# Patient Record
Sex: FEMALE | Race: WHITE | NOT HISPANIC OR LATINO | Employment: FULL TIME | URBAN - NONMETROPOLITAN AREA
[De-identification: names, ages, dates, MRNs, and addresses within clinical notes are randomized per-mention and may not be internally consistent; named-entity substitution may affect disease eponyms.]

---

## 2017-05-30 ENCOUNTER — COMMUNICATION - GICH (OUTPATIENT)
Dept: FAMILY MEDICINE | Facility: OTHER | Age: 32
End: 2017-05-30

## 2017-05-30 DIAGNOSIS — I10 ESSENTIAL (PRIMARY) HYPERTENSION: ICD-10-CM

## 2017-06-28 ENCOUNTER — AMBULATORY - GICH (OUTPATIENT)
Dept: LAB | Facility: OTHER | Age: 32
End: 2017-06-28

## 2017-06-28 ENCOUNTER — HISTORY (OUTPATIENT)
Dept: FAMILY MEDICINE | Facility: OTHER | Age: 32
End: 2017-06-28

## 2017-06-28 ENCOUNTER — OFFICE VISIT - GICH (OUTPATIENT)
Dept: FAMILY MEDICINE | Facility: OTHER | Age: 32
End: 2017-06-28

## 2017-06-28 DIAGNOSIS — I10 ESSENTIAL (PRIMARY) HYPERTENSION: ICD-10-CM

## 2017-06-28 DIAGNOSIS — Z13.1 ENCOUNTER FOR SCREENING FOR DIABETES MELLITUS: ICD-10-CM

## 2017-06-28 DIAGNOSIS — Z13.220 ENCOUNTER FOR SCREENING FOR LIPOID DISORDERS: ICD-10-CM

## 2017-06-28 DIAGNOSIS — Z00.00 ENCOUNTER FOR GENERAL ADULT MEDICAL EXAMINATION WITHOUT ABNORMAL FINDINGS: ICD-10-CM

## 2017-06-28 LAB
ANION GAP - HISTORICAL: 6 (ref 5–18)
BUN SERPL-MCNC: 12 MG/DL (ref 7–25)
BUN/CREAT RATIO - HISTORICAL: 14
CALCIUM SERPL-MCNC: 9.1 MG/DL (ref 8.6–10.3)
CHLORIDE SERPLBLD-SCNC: 103 MMOL/L (ref 98–107)
CHOL/HDL RATIO - HISTORICAL: 2.92
CHOLESTEROL TOTAL: 143 MG/DL
CO2 SERPL-SCNC: 26 MMOL/L (ref 21–31)
CREAT SERPL-MCNC: 0.86 MG/DL (ref 0.7–1.3)
GFR IF NOT AFRICAN AMERICAN - HISTORICAL: >60 ML/MIN/1.73M2
GLUCOSE SERPL-MCNC: 106 MG/DL (ref 70–105)
HDLC SERPL-MCNC: 49 MG/DL (ref 23–92)
LDLC SERPL CALC-MCNC: 82 MG/DL
NON-HDL CHOLESTEROL - HISTORICAL: 94 MG/DL
PATIENT STATUS - HISTORICAL: NORMAL
POTASSIUM SERPL-SCNC: 4 MMOL/L (ref 3.5–5.1)
SODIUM SERPL-SCNC: 135 MMOL/L (ref 133–143)
TRIGL SERPL-MCNC: 60 MG/DL

## 2017-12-27 NOTE — PROGRESS NOTES
Patient Information     Patient Name MRN Sex     Hoda Abrams [id number] Female 1985      Progress Notes by Tisha Pham DO at 2017  3:45 PM     Author:  Tisha Pham DO Service:  (none) Author Type:  PHYS- Osteopathic     Filed:  2017  7:11 AM Encounter Date:  2017 Status:  Signed     :  Tisha Pham DO (PHYS- Osteopathic)            There are no exam notes on file for this visit.    ANNUAL PHYSICAL - FEMALE    HPI: Hoda Abrams is a 32 y.o. female who presents for a yearly exam.  Concerns include: none    No LMP recorded.   Contraception: none, not sexually active  Risk for STI?: no  Last pap: 2015  Any hx of abnormal paps:  ASCUS at last pap, but with negative HPV.  Due again in 3 years.  FH of early CA?: No  Cholesterol/DM concerns/screening: yes, will obtain   Tobacco?: no  Calcium intake: No  DEXA: NA  Last mammo: NA  Colonoscopy: NA  Immunizations: Tdap 2008; recommend yearly flu vaccines; shingles and pneumonia vaccines due later.    Patient Active Problem List      Diagnosis Date Noted     Hypertension 2015     OBESITY 2012       No past medical history on file.    Past Surgical History:      Procedure  Laterality Date     WISDOM TEETH EXTRACTION         Social History     Social History        Marital status:  Single     Spouse name: N/A     Number of children:  N/A     Years of education:  N/A     Occupational History      Not on file.     Social History Main Topics       Smoking status: Never Smoker     Smokeless tobacco: Not on file     Alcohol use Yes     Drug use: No     Sexual activity: Not on file     Other Topics  Concern     Not on file      Social History Narrative     Grew up in . 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       Family History       Problem   Relation Age of Onset     Other  Mother      Colitis, obesity       Good Health  Father      Other  Sister      Obesity        "Psychiatric illness  Sister      Anxiety       Hypertension  Paternal Uncle      Diabetes  Maternal Grandfather      Diabetes  Paternal Grandfather        Current Outpatient Prescriptions       Medication  Sig Dispense Refill     lisinopril (PRINIVIL; ZESTRIL) 20 mg tablet TAKE ONE TABLET BY MOUTH ONCE DAILY. 90 tablet 4     No current facility-administered medications for this visit.      Medications have been reviewed by me and are current to the best of my knowledge and ability.       REVIEW OF SYSTEMS:  Refer to HPI; all other systems reviewed and negative.    PHYSICAL EXAM:  /82  Pulse 76  Ht 1.632 m (5' 4.25\")  Wt 119.7 kg (264 lb)  BMI 44.96 kg/m2  CONSTITUTIONAL:  Alert, cooperative, NAD.  EYES: No scleral icterus.  PERRLA.  Conjunctiva clear.  ENT/MOUTH: External ears and nose normal.  TMs normal.  Moist mucous membranes. Oropharynx clear.    ENDO: No thyromegaly or thyroid nodules.  LYMPH:  No cervical or supraclavicular LA.    BREASTS: declines  CARDIOVASCULAR: Regular, S1, S2.  No S3 or S4.  No murmur/gallop/rub.  No peripheral edema.  RESPIRATORY: CTA bilaterally, no wheezes, rhonchi or rales.  GI: Bowel sounds wnl.  Soft, nontender, nondistended.  No masses or HSM.  No rebound or guarding.  : declines  Pap smear obtained: no  MSKEL: Grossly normal ROM.  No clubbing.  INTEGUMENTARY:  Warm, dry.  No rash noted on exposed skin.  NEUROLOGIC: Facies symmetric.  Grossly normal movement and tone.  No tremor.  PSYCHIATRIC: Affect normal.  Speech fluent.      PHQ Depression Screen  Date of PHQ exam: 06/28/17  Over the last 2 weeks, how often have you been bothered by any of the following problems?  1. Little interest or pleasure in doing things: 0 - Not at all  2. Feeling down, depressed, or hopeless: 0 - Not at all    Results for orders placed or performed in visit on 06/28/17      LIPID PANEL      Result  Value Ref Range    CHOLESTEROL,TOTAL 143 <200 mg/dL    TRIGLYCERIDES 60 <150 mg/dL    HDL " CHOLESTEROL 49 23 - 92 mg/dL    NON-HDL CHOLESTEROL 94 <145 mg/dl    CHOL/HDL RATIO            2.92 <4.50                    LDL CHOLESTEROL 82 <100 mg/dL    PATIENT STATUS            FASTING                   BASIC METABOLIC PANEL      Result  Value Ref Range    SODIUM 135 133 - 143 mmol/L    POTASSIUM 4.0 3.5 - 5.1 mmol/L    CHLORIDE 103 98 - 107 mmol/L    CO2,TOTAL 26 21 - 31 mmol/L    ANION GAP 6 5 - 18                    GLUCOSE 106 (H) 70 - 105 mg/dL    CALCIUM 9.1 8.6 - 10.3 mg/dL    BUN 12 7 - 25 mg/dL    CREATININE 0.86 0.70 - 1.30 mg/dL    BUN/CREAT RATIO           14                    GFR if African American >60 >60 ml/min/1.73m2    GFR if not African American >60 >60 ml/min/1.73m2       ASSESSMENT/PLAN:    ICD-10-CM    1. Annual physical exam Z00.00    2. Hypertension I10 LIPID PANEL      BASIC METABOLIC PANEL   3. Lipid screening Z13.220 LIPID PANEL   4. Diabetes mellitus screening Z13.1 BASIC METABOLIC PANEL     Relevant cancer screening discussed.    Counseled on healthy diet, Calcium and vitamin D intake, and exercise.    HTN, stable: resume same dose of lisinopril.  Encouraged low salt diet and healthy activity levels to help improve BP control.    Follow up yearly; sooner prn.    ESTHER LOZADA DO

## 2018-01-26 ENCOUNTER — DOCUMENTATION ONLY (OUTPATIENT)
Dept: FAMILY MEDICINE | Facility: OTHER | Age: 33
End: 2018-01-26

## 2018-01-27 VITALS
BODY MASS INDEX: 45.07 KG/M2 | DIASTOLIC BLOOD PRESSURE: 82 MMHG | HEART RATE: 76 BPM | HEIGHT: 64 IN | WEIGHT: 264 LBS | SYSTOLIC BLOOD PRESSURE: 128 MMHG

## 2018-06-27 ENCOUNTER — TELEPHONE (OUTPATIENT)
Dept: FAMILY MEDICINE | Facility: OTHER | Age: 33
End: 2018-06-27

## 2018-06-27 DIAGNOSIS — I10 ESSENTIAL HYPERTENSION: Primary | ICD-10-CM

## 2018-06-27 RX ORDER — LISINOPRIL 20 MG/1
20 TABLET ORAL DAILY
Qty: 90 TABLET | Refills: 4 | Status: SHIPPED | OUTPATIENT
Start: 2018-06-27 | End: 2019-05-16

## 2018-06-27 RX ORDER — LISINOPRIL 20 MG/1
20 TABLET ORAL DAILY
COMMUNITY
Start: 2017-05-30 | End: 2018-06-27

## 2018-06-27 NOTE — TELEPHONE ENCOUNTER
Patient needs a refill of her medication, she will make an appointment.  Martine Joyce............................... 6/27/2018 3:31 PM

## 2018-09-17 ENCOUNTER — OFFICE VISIT (OUTPATIENT)
Dept: FAMILY MEDICINE | Facility: OTHER | Age: 33
End: 2018-09-17
Attending: NURSE PRACTITIONER
Payer: COMMERCIAL

## 2018-09-17 VITALS
HEART RATE: 74 BPM | SYSTOLIC BLOOD PRESSURE: 128 MMHG | TEMPERATURE: 97.9 F | DIASTOLIC BLOOD PRESSURE: 88 MMHG | WEIGHT: 263 LBS | BODY MASS INDEX: 42.27 KG/M2 | HEIGHT: 66 IN

## 2018-09-17 DIAGNOSIS — L23.7 CONTACT DERMATITIS DUE TO POISON IVY: Primary | ICD-10-CM

## 2018-09-17 PROCEDURE — 99213 OFFICE O/P EST LOW 20 MIN: CPT | Performed by: NURSE PRACTITIONER

## 2018-09-17 RX ORDER — TRIAMCINOLONE ACETONIDE 1 MG/G
CREAM TOPICAL
Qty: 80 G | Refills: 0 | Status: SHIPPED | OUTPATIENT
Start: 2018-09-17 | End: 2019-05-16

## 2018-09-17 ASSESSMENT — PAIN SCALES - GENERAL: PAINLEVEL: NO PAIN (1)

## 2018-09-17 NOTE — MR AVS SNAPSHOT
"              After Visit Summary   2018    Hoda Abrams    MRN: [id number]           Patient Information     Date Of Birth          1985        Visit Information        Provider Department      2018 9:30 AM Dipti Falk APRN CNP [facility]        Today's Diagnoses     Contact dermatitis due to poison ivy    -  1       Follow-ups after your visit        Who to contact     If you have questions or need follow up information about today's clinic visit or your schedule please contact [facility] directly at [phone].  Normal or non-critical lab and imaging results will be communicated to you by [employer] [facility] [employer], letter or phone within 4 business days after the clinic has received the results. If you do not hear from us within 7 days, please contact the clinic through IM5t or phone. If you have a critical or abnormal lab result, we will notify you by phone as soon as possible.  Submit refill requests through [employer] or call your pharmacy and they will forward the refill request to us. Please allow 3 business days for your refill to be completed.          Additional Information About Your Visit        MyChart Information     Zolpy lets you send messages to your doctor, view your test results, renew your prescriptions, schedule appointments and more. To sign up, go to [email or web address] . Click on \"Log in\" on the left side of the screen, which will take you to the Welcome page. Then click on \"Sign up Now\" on the right side of the page.     You will be asked to enter the access code listed below, as well as some personal information. Please follow the directions to create your username and password.     Your access code is: [email or web address]  Expires: 2018  9:57 AM     Your access code will  in 90 days. If you need help or a new code, please call your [place] clinic or [phone].        Care EveryWhere ID     This is your Care EveryWhere ID. " "This could be used by other organizations to access your [facility] medical records  [id number]        Your Vitals Were     Pulse Temperature Height Breastfeeding? BMI (Body Mass Index)       74 97.9  F (36.6  C) (Tympanic) 5' 5.75\" (1.67 m) No 42.78 kg/m2        Blood Pressure from Last 3 Encounters:   09/17/18 128/88   06/28/17 128/82   05/19/16 132/84    Weight from Last 3 Encounters:   09/17/18 263 lb (119.3 kg)   06/28/17 264 lb (119.7 kg)   05/19/16 245 lb 12.8 oz (111.5 kg)              Today, you had the following     No orders found for display         Today's Medication Changes          These changes are accurate as of 9/17/18  9:57 AM.  If you have any questions, ask your nurse or doctor.               Start taking these medicines.        Dose/Directions    triamcinolone 0.1 % cream   Commonly known as:  KENALOG   Used for:  Contact dermatitis due to poison ivy   Started by:  Dipti Falk APRN CNP        Apply sparingly to affected area three times daily as needed   Quantity:  80 g   Refills:  0            Where to get your medicines      These medications were sent to [facility], - [place], [place] - [address]  [address] , [place] [address]     Phone:  [phone]     triamcinolone 0.1 % cream                Primary Care Provider Office Phone # Fax #    Tisha PADMINI Pham [phone]       [address] [facility] [address]        Equal Access to Services     [facility]: Hadii aad ku hadasho Soomaali, waaxda luqadaha, qaybta kaalmada dashawn hannon . So [facility] [phone].    ATENCIÓN: Si habla español, tiene a clark disposición servicios gratuitos de asistencia lingüística. Llame al [phone].    We comply with applicable federal civil rights laws and [place] laws. We do not discriminate on the basis of race, color, national origin, age, disability, sex, sexual orientation, or gender " identity.            Thank you!     Thank you for choosing [facility] [place] [facility] AND \[facility]\""  for your care. Our goal is always to provide you with excellent care. Hearing back from our patients is one way we can continue to improve our services. Please take a few minutes to complete the written survey that you may receive in the mail after your visit with us. Thank you!             Your Updated Medication List - Protect others around you: Learn how to safely use, store and throw away your medicines at www.disposemymeds.org.          This list is accurate as of 9/17/18  9:57 AM.  Always use your most recent med list.                   Brand Name Dispense Instructions for use Diagnosis    lisinopril 20 MG tablet    PRINIVIL/ZESTRIL    90 tablet    Take 1 tablet (20 mg) by mouth daily    Essential hypertension       triamcinolone 0.1 % cream    KENALOG    80 g    Apply sparingly to affected area three times daily as needed    Contact dermatitis due to poison ivy

## 2018-09-17 NOTE — PROGRESS NOTES
HPI:    Hoda Abrams is a 33 year old female who presents to clinic today for rash. Has itchy rash on chest and under right breast. Has had this for about a week. She has a dog that runs in grass. Unsure of any exposure to poison ivy. Has used hydrocodone with mild relief. No other new exposures.     History reviewed. No pertinent past medical history.    Past Surgical History:   Procedure Laterality Date     EXTRACTION(S) DENTAL      2004         Current Outpatient Prescriptions   Medication Sig Dispense Refill     lisinopril (PRINIVIL/ZESTRIL) 20 MG tablet Take 1 tablet (20 mg) by mouth daily 90 tablet 4     triamcinolone (KENALOG) 0.1 % cream Apply sparingly to affected area three times daily as needed 80 g 0       No Known Allergies    ROS:  Pertinent positives and negatives are noted in HPI.    EXAM:  General appearance: well appearing female, in no acute distress  Dermatological: under right breast with linear rash, no pustules. No drainage or erythema  Psychological: normal affect, alert and pleasant    ASSESSMENT AND PLAN:    1. Contact dermatitis due to poison ivy      Tx with triamcinolone cream. Discussed other sx management and s/s that would warrant f/u. All questions were answered and she is in agreement with plan.         Dipti Falk..................9/17/2018 9:33 AM

## 2018-09-17 NOTE — NURSING NOTE
"Patient presents to clinic with complaints of an extremely itchy rash on her chest and under her right breast.      Lesli Friedman LPN 9/17/2018   9:33 AM    Chief Complaint   Patient presents with     Derm Problem       Initial /88 (BP Location: Right arm, Patient Position: Sitting, Cuff Size: Adult Large)  Pulse 74  Temp 97.9  F (36.6  C) (Tympanic)  Ht 5' 5.75\" (1.67 m)  Wt 263 lb (119.3 kg)  Breastfeeding? No  BMI 42.78 kg/m2 Estimated body mass index is 42.78 kg/(m^2) as calculated from the following:    Height as of this encounter: 5' 5.75\" (1.67 m).    Weight as of this encounter: 263 lb (119.3 kg).  Medication Reconciliation: complete    Lesli Friedman    "

## 2018-10-31 ENCOUNTER — HEALTH MAINTENANCE LETTER (OUTPATIENT)
Age: 33
End: 2018-10-31

## 2019-05-03 DIAGNOSIS — I10 ESSENTIAL HYPERTENSION: Primary | ICD-10-CM

## 2019-05-03 DIAGNOSIS — Z13.220 LIPID SCREENING: ICD-10-CM

## 2019-05-03 DIAGNOSIS — Z13.1 SCREENING FOR DIABETES MELLITUS: ICD-10-CM

## 2019-05-10 DIAGNOSIS — Z13.1 SCREENING FOR DIABETES MELLITUS: ICD-10-CM

## 2019-05-10 DIAGNOSIS — Z13.220 LIPID SCREENING: ICD-10-CM

## 2019-05-10 DIAGNOSIS — I10 ESSENTIAL HYPERTENSION: ICD-10-CM

## 2019-05-10 LAB
ANION GAP SERPL CALCULATED.3IONS-SCNC: 8 MMOL/L (ref 3–14)
BUN SERPL-MCNC: 14 MG/DL (ref 7–25)
CALCIUM SERPL-MCNC: 9.2 MG/DL (ref 8.6–10.3)
CHLORIDE SERPL-SCNC: 104 MMOL/L (ref 98–107)
CHOLEST SERPL-MCNC: 139 MG/DL
CO2 SERPL-SCNC: 26 MMOL/L (ref 21–31)
CREAT SERPL-MCNC: 0.92 MG/DL (ref 0.6–1.2)
GFR SERPL CREATININE-BSD FRML MDRD: 71 ML/MIN/{1.73_M2}
GLUCOSE SERPL-MCNC: 101 MG/DL (ref 70–105)
HBA1C MFR BLD: 5.5 % (ref 4–6)
HDLC SERPL-MCNC: 47 MG/DL (ref 23–92)
LDLC SERPL CALC-MCNC: 81 MG/DL
NONHDLC SERPL-MCNC: 92 MG/DL
POTASSIUM SERPL-SCNC: 4.1 MMOL/L (ref 3.5–5.1)
SODIUM SERPL-SCNC: 138 MMOL/L (ref 134–144)
TRIGL SERPL-MCNC: 57 MG/DL

## 2019-05-10 PROCEDURE — 80061 LIPID PANEL: CPT | Performed by: FAMILY MEDICINE

## 2019-05-10 PROCEDURE — 36415 COLL VENOUS BLD VENIPUNCTURE: CPT | Performed by: FAMILY MEDICINE

## 2019-05-10 PROCEDURE — 83036 HEMOGLOBIN GLYCOSYLATED A1C: CPT | Performed by: FAMILY MEDICINE

## 2019-05-10 PROCEDURE — 80048 BASIC METABOLIC PNL TOTAL CA: CPT | Performed by: FAMILY MEDICINE

## 2019-05-16 ENCOUNTER — OFFICE VISIT (OUTPATIENT)
Dept: FAMILY MEDICINE | Facility: OTHER | Age: 34
End: 2019-05-16
Attending: FAMILY MEDICINE
Payer: COMMERCIAL

## 2019-05-16 VITALS
WEIGHT: 263 LBS | DIASTOLIC BLOOD PRESSURE: 94 MMHG | HEIGHT: 66 IN | RESPIRATION RATE: 16 BRPM | BODY MASS INDEX: 42.27 KG/M2 | TEMPERATURE: 99.6 F | HEART RATE: 64 BPM | SYSTOLIC BLOOD PRESSURE: 136 MMHG

## 2019-05-16 DIAGNOSIS — Z23 NEED FOR TDAP VACCINATION: ICD-10-CM

## 2019-05-16 DIAGNOSIS — I10 ESSENTIAL HYPERTENSION: ICD-10-CM

## 2019-05-16 DIAGNOSIS — E66.813 CLASS 3 SEVERE OBESITY DUE TO EXCESS CALORIES WITH SERIOUS COMORBIDITY AND BODY MASS INDEX (BMI) OF 40.0 TO 44.9 IN ADULT (H): ICD-10-CM

## 2019-05-16 DIAGNOSIS — E66.01 CLASS 3 SEVERE OBESITY DUE TO EXCESS CALORIES WITH SERIOUS COMORBIDITY AND BODY MASS INDEX (BMI) OF 40.0 TO 44.9 IN ADULT (H): ICD-10-CM

## 2019-05-16 DIAGNOSIS — Z00.00 ROUTINE HISTORY AND PHYSICAL EXAMINATION OF ADULT: Primary | ICD-10-CM

## 2019-05-16 DIAGNOSIS — Z12.4 CERVICAL CANCER SCREENING: ICD-10-CM

## 2019-05-16 PROCEDURE — 90471 IMMUNIZATION ADMIN: CPT | Performed by: FAMILY MEDICINE

## 2019-05-16 PROCEDURE — 88142 CYTOPATH C/V THIN LAYER: CPT | Performed by: FAMILY MEDICINE

## 2019-05-16 PROCEDURE — 90715 TDAP VACCINE 7 YRS/> IM: CPT | Performed by: FAMILY MEDICINE

## 2019-05-16 PROCEDURE — 40001026 ZZHCL STATISTICAL PAP TEST QC: Performed by: FAMILY MEDICINE

## 2019-05-16 PROCEDURE — 99395 PREV VISIT EST AGE 18-39: CPT | Mod: 25 | Performed by: FAMILY MEDICINE

## 2019-05-16 PROCEDURE — G0123 SCREEN CERV/VAG THIN LAYER: HCPCS | Performed by: FAMILY MEDICINE

## 2019-05-16 PROCEDURE — 87624 HPV HI-RISK TYP POOLED RSLT: CPT | Mod: ZL | Performed by: FAMILY MEDICINE

## 2019-05-16 RX ORDER — LISINOPRIL 20 MG/1
20 TABLET ORAL DAILY
Qty: 90 TABLET | Refills: 4 | Status: SHIPPED | OUTPATIENT
Start: 2019-05-16 | End: 2019-05-16

## 2019-05-16 RX ORDER — LISINOPRIL 20 MG/1
20 TABLET ORAL DAILY
Qty: 90 TABLET | Refills: 4 | Status: SHIPPED | OUTPATIENT
Start: 2019-05-16 | End: 2020-07-17

## 2019-05-16 ASSESSMENT — MIFFLIN-ST. JEOR: SCORE: 1906.77

## 2019-05-16 NOTE — NURSING NOTE
Prior to injection, verified patient identity using patient's name and date of birth.  Due to injection administration, patient instructed to remain in clinic for 15 minutes  afterwards, and to report any adverse reaction to me immediately.    TDAP    Drug Amount Wasted:  None.  Vial/Syringe: Syringe  Expiration Date:  6/5/21

## 2019-05-16 NOTE — LETTER
Hoda Abrams  [address]  [place] [address]    5/16/2019      Dear Ms. Abrams,      I wanted to let you know about your recent labs.    Everything looks improved!    Please contact us at [phone] with any questions or concerns that you have.    I have attached your lab results for your records.        Sincerely,         Tisha Pham     Results for orders placed or performed in visit on 05/10/19   Hemoglobin A1c   Result Value Ref Range    Hemoglobin A1C 5.5 4.0 - 6.0 %   Lipid Panel   Result Value Ref Range    Cholesterol 139 <200 mg/dL    Triglycerides 57 <150 mg/dL    HDL Cholesterol 47 23 - 92 mg/dL    LDL Cholesterol Calculated 81 <100 mg/dL    Non HDL Cholesterol 92 <130 mg/dL   Basic Metabolic Panel   Result Value Ref Range    Sodium 138 134 - 144 mmol/L    Potassium 4.1 3.5 - 5.1 mmol/L    Chloride 104 98 - 107 mmol/L    Carbon Dioxide 26 21 - 31 mmol/L    Anion Gap 8 3 - 14 mmol/L    Glucose 101 70 - 105 mg/dL    Urea Nitrogen 14 7 - 25 mg/dL    Creatinine 0.92 0.60 - 1.20 mg/dL    GFR Estimate 71 >60 mL/min/[1.73_m2]    GFR Estimate If Black 86 >60 mL/min/[1.73_m2]    Calcium 9.2 8.6 - 10.3 mg/dL

## 2019-05-16 NOTE — PROGRESS NOTES
Nursing Notes:   Martine Haskins LPN  5/16/2019  2:23 PM  Signed  Patient here for physical.  Medication Reconciliation: REI Huggins Emily J., LPN  5/16/2019  3:29 PM  Signed  Prior to injection, verified patient identity using patient's name and date of birth.  Due to injection administration, patient instructed to remain in clinic for 15 minutes  afterwards, and to report any adverse reaction to me immediately.    TDAP  Drug Amount Wasted:  None.  Vial/Syringe: Syringe  Expiration Date:  6/5/21    ANNUAL PHYSICAL - FEMALE  HPI: Hoda presents for a yearly exam.  Concerns include:  1. None; has been working on diet/exercise and has lost some weight.  Had labs performed prior to physical and they are greatly improved, including her Hgb A1c.  2. BP stable.  Denies any headaches, vision changes, SOB, CP, diaphoresis or indigestion.  3. Needs Tdap.    No LMP recorded.   Contraception: NA  Risk for STI?: No  Last pap: 8/18/2015; ASCUS with neg HPV.  Any hx of abnormal paps:  None prior to that.  FH ofearly CA?: No  Cholesterol/DM concerns/screening: recently done; reviewed.  Tobacco?: No  Calcium intake: No  DEXA: NA  Last mammo: NA  Colonoscopy: NA  Immunizations: Tdap due; flu vaccines yearly with work.    Patient Active Problem List   Diagnosis     Essential hypertension     Obesity     No past medical history on file.    Past Surgical History:   Procedure Laterality Date     EXTRACTION(S) DENTAL      2004       Social History     Socioeconomic History     Marital status: Single     Spouse name: Not on file     Number of children: Not on file     Years of education: Not on file     Highest education level: Not on file   Occupational History     Not on file   Social Needs     Financial resource strain: Not on file     Food insecurity:     Worry: Not on file     Inability: Not on file     Transportation needs:     Medical: Not on file     Non-medical: Not on file   Tobacco Use      Smoking status: Never Smoker     Smokeless tobacco: Never Used   Substance and Sexual Activity     Alcohol use: Yes     Comment: Beer a day     Drug use: No     Comment: Drug use: No     Sexual activity: Not on file   Lifestyle     Physical activity:     Days per week: Not on file     Minutes per session: Not on file     Stress: Not on file   Relationships     Social connections:     Talks on phone: Not on file     Gets together: Not on file     Attends Scientologist service: Not on file     Active member of club or organization: Not on file     Attends meetings of clubs or organizations: Not on file     Relationship status: Not on file     Intimate partner violence:     Fear of current or ex partner: Not on file     Emotionally abused: Not on file     Physically abused: Not on file     Forced sexual activity: Not on file   Other Topics Concern     Parent/sibling w/ CABG, MI or angioplasty before 65F 55M? Not Asked   Social History Narrative    Grew up in . 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       Family History   Problem Relation Age of Onset     Colitis Mother      Obesity Mother      No Known Problems Father      Anxiety Disorder Sister      Obesity Sister      Diabetes Maternal Grandfather      Diabetes Paternal Grandfather      Hypertension Paternal Uncle      Pancreatic Cancer Maternal Aunt        Current Outpatient Medications   Medication Sig Dispense Refill     lisinopril (PRINIVIL/ZESTRIL) 20 MG tablet Take 1 tablet (20 mg) by mouth daily 90 tablet 4     triamcinolone (KENALOG) 0.1 % cream Apply sparingly to affected area three times daily as needed 80 g 0        REVIEW OF SYSTEMS:  Refer to HPI; all other systems reviewed and negative.    PHYSICAL EXAM:  There were no vitals taken for this visit.  CONSTITUTIONAL:  Alert, cooperative, NAD.  EYES: No scleral icterus.  PERRLA.  Conjunctiva clear.  ENT/MOUTH: External ears and nose normal.  TMs normal.  Moist mucous  membranes. Oropharynx clear.    ENDO: No thyromegaly or thyroid nodules.  LYMPH:  No cervical or supraclavicular LA.    BREASTS: No skin abnormalities, no erythema.  No discrete masses.  No nipple discharge, no axillary, supra- or infraclavicular LA.   CARDIOVASCULAR: Regular, S1, S2.  No S3 or S4.  No murmur/gallop/rub.  No peripheral edema.  RESPIRATORY: CTA bilaterally, no wheezes, rhonchi or rales.  GI: Bowel sounds wnl.  Soft, nontender, non-distended.  No masses or HSM.  No rebound or guarding.  : Vulva: normal, no lesions or discharge  Urethral meatus: normal size and location, no lesions or discharge  Urethra: no tenderness or masses  Bladder: no fullness or tenderness  Vagina: normal appearance, no abnormal discharge, no lesions.  No evidence of cystocele or rectocele.  Cervix: normal appearance, no lesions, no abnormal discharge.  Uterus: normal size and position, mobile, non-tender  Adnexa: nopalpable masses bilaterally. No cervical motion tenderness.  Pap smear obtained: yes  MSKEL: Grossly normal ROM.  No clubbing.  INTEGUMENTARY:Warm, dry.  No rash noted on exposed skin.  NEUROLOGIC: Facies symmetric.  Grossly normal movement and tone.  No tremor.  PSYCHIATRIC: Affect normal.  Speech fluent.      PHQ-9 SCORE 8/18/2015 5/19/2016   PHQ-9 Total Score 1 1     PHQ-2 Score:     PHQ-2 ( 1999 Pfizer) 9/17/2018   Q1: Little interest or pleasure in doing things 0   Q2: Feeling down, depressed or hopeless 0   PHQ-2 Score 0     No flowsheet data found.          Results for orders placed or performed in visit on 05/10/19   Hemoglobin A1c   Result Value Ref Range    Hemoglobin A1C 5.5 4.0 - 6.0 %   Lipid Panel   Result Value Ref Range    Cholesterol 139 <200 mg/dL    Triglycerides 57 <150 mg/dL    HDL Cholesterol 47 23 - 92 mg/dL    LDL Cholesterol Calculated 81 <100 mg/dL    Non HDL Cholesterol 92 <130 mg/dL   Basic Metabolic Panel   Result Value Ref Range    Sodium 138 134 - 144 mmol/L    Potassium 4.1 3.5 - 5.1  mmol/L    Chloride 104 98 - 107 mmol/L    Carbon Dioxide 26 21 - 31 mmol/L    Anion Gap 8 3 - 14 mmol/L    Glucose 101 70 - 105 mg/dL    Urea Nitrogen 14 7 - 25 mg/dL    Creatinine 0.92 0.60 - 1.20 mg/dL    GFR Estimate 71 >60 mL/min/[1.73_m2]    GFR Estimate If Black 86 >60 mL/min/[1.73_m2]    Calcium 9.2 8.6 - 10.3 mg/dL       ASSESSMENT/PLAN:  1. Routine history and physical examination of adult    2. Cervical cancer screening  - Pap Screen Thin Prep with HPV - recommended age 30 - 65 years (select HPV order below)  - HPV High Risk Types DNA Cervical    3. Need for Tdap vaccination  - GH IMM-  TDAP VACCINE (BOOSTRIX )    4. Essential hypertension  Chronic, stable.  Refilled her lisinopril at the same dose.  If continued efforts of diet changes and weight loss are effective - will likely see decreased BP and may start to be able to wean medications.  Recent labs stable.   - lisinopril (PRINIVIL/ZESTRIL) 20 MG tablet; Take 1 tablet (20 mg) by mouth daily  Dispense: 90 tablet; Refill: 4    5. Class 3 severe obesity due to excess calories with serious comorbidity and body mass index (BMI) of 40.0 to 44.9 in adult (H)  Improved; encouraged on continued efforts.      Relevant cancer screening discussed.    Counseled on healthy diet, Calcium and vitamin D intake, and exercise.    Tisha Pham, DO  Family Practice

## 2019-05-24 LAB
COPATH REPORT: NORMAL
FINAL DIAGNOSIS: NORMAL
HPV HR 12 DNA CVX QL NAA+PROBE: NEGATIVE
HPV16 DNA SPEC QL NAA+PROBE: NEGATIVE
HPV18 DNA SPEC QL NAA+PROBE: NEGATIVE
PAP: NORMAL
SPECIMEN DESCRIPTION: NORMAL
SPECIMEN SOURCE CVX/VAG CYTO: NORMAL

## 2019-06-24 ENCOUNTER — THERAPY VISIT (OUTPATIENT)
Dept: CHIROPRACTIC MEDICINE | Facility: OTHER | Age: 34
End: 2019-06-24
Attending: CHIROPRACTOR
Payer: COMMERCIAL

## 2019-06-24 DIAGNOSIS — E66.813 CLASS 3 SEVERE OBESITY DUE TO EXCESS CALORIES WITH SERIOUS COMORBIDITY AND BODY MASS INDEX (BMI) OF 40.0 TO 44.9 IN ADULT (H): ICD-10-CM

## 2019-06-24 DIAGNOSIS — M54.50 ACUTE MIDLINE LOW BACK PAIN WITHOUT SCIATICA: ICD-10-CM

## 2019-06-24 DIAGNOSIS — E66.01 CLASS 3 SEVERE OBESITY DUE TO EXCESS CALORIES WITH SERIOUS COMORBIDITY AND BODY MASS INDEX (BMI) OF 40.0 TO 44.9 IN ADULT (H): ICD-10-CM

## 2019-06-24 DIAGNOSIS — M99.03 SEGMENTAL AND SOMATIC DYSFUNCTION OF LUMBAR REGION: Primary | ICD-10-CM

## 2019-06-24 PROCEDURE — 98940 CHIROPRACT MANJ 1-2 REGIONS: CPT | Mod: AT | Performed by: CHIROPRACTOR

## 2019-06-24 PROCEDURE — 99202 OFFICE O/P NEW SF 15 MIN: CPT | Mod: 25 | Performed by: CHIROPRACTOR
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
## 2019-06-26 ENCOUNTER — THERAPY VISIT (OUTPATIENT)
Dept: CHIROPRACTIC MEDICINE | Facility: OTHER | Age: 34
End: 2019-06-26
Attending: CHIROPRACTOR
Payer: COMMERCIAL

## 2019-06-26 DIAGNOSIS — E66.01 CLASS 3 SEVERE OBESITY DUE TO EXCESS CALORIES WITH SERIOUS COMORBIDITY AND BODY MASS INDEX (BMI) OF 40.0 TO 44.9 IN ADULT (H): ICD-10-CM

## 2019-06-26 DIAGNOSIS — M99.03 SEGMENTAL AND SOMATIC DYSFUNCTION OF LUMBAR REGION: Primary | ICD-10-CM

## 2019-06-26 DIAGNOSIS — M99.04 SEGMENTAL AND SOMATIC DYSFUNCTION OF SACRAL REGION: ICD-10-CM

## 2019-06-26 DIAGNOSIS — E66.813 CLASS 3 SEVERE OBESITY DUE TO EXCESS CALORIES WITH SERIOUS COMORBIDITY AND BODY MASS INDEX (BMI) OF 40.0 TO 44.9 IN ADULT (H): ICD-10-CM

## 2019-06-26 DIAGNOSIS — M54.50 ACUTE MIDLINE LOW BACK PAIN WITHOUT SCIATICA: ICD-10-CM

## 2019-06-26 PROCEDURE — 98940 CHIROPRACT MANJ 1-2 REGIONS: CPT | Mod: AT | Performed by: CHIROPRACTOR

## 2019-06-26 NOTE — PATIENT INSTRUCTIONS
ice 20 minutes every other hour as needed, heat 15 minutes every other hour as needed and walk 10 minutes

## 2019-06-26 NOTE — PROGRESS NOTES
Visit #:  2    Subjective:  Hoda Abrams is a 34 year old female who is seen in f/u up for:        Segmental and somatic dysfunction of lumbar region  Segmental and somatic dysfunction of sacral region  Acute midline low back pain without sciatica  Class 3 severe obesity due to excess calories with serious comorbidity and body mass index (BMI) of 40.0 to 44.9 in adult (H).     Since last visit on 6/24/2019,  Hoda Abrams reports:    Area of chief complaint:  Lumbar :  Symptoms are graded at 2-5/10. The quality is described as achey, sore.  Motion has increased, but is still not normal, patient notes difficulty with standing and sitting for extended periods of time. Patient feels that they are improved due to a reduction in symptoms. Patient notes symptoms are interfering with sleep, however this is improving slowly. Patient has returned to work since initial visit.           Objective:  The following was observed:    P: palpatory tendernessPresent over the TL junction midline and along the sacral base:    A: static palpation demonstrates intersegmental asymmetry , lumbar, pelvis  R: motion palpation notes restricted motion, L1 and Sacrum   T: Taut and tender fibers are noted of the gluteus medius bilaterally and quadratus lumborum bilaterally.  These muscles do not appear to be spastic on today's visit    Segmental spinal dysfunction/restrictions found at:  :  L1 Extension restriction  Sacrum Flexion restriction.      Assessment: Patient appears to making progress.  Palpatory tenderness decreasing and some ADLs are less affected due to pain.  Patient is still having difficulty with sleeping.  I believe that this will continue to show improvement with additional visits.    Diagnoses:      1. Segmental and somatic dysfunction of lumbar region    2. Segmental and somatic dysfunction of sacral region    3. Acute midline low back pain without sciatica    4. Class 3 severe obesity due to excess calories with serious  comorbidity and body mass index (BMI) of 40.0 to 44.9 in adult (H)        Patient's condition:  Patient had restrictions pre-manipulation and Patient symptoms are gradually improving    Treatment effectiveness:  Post manipulation there is better intersegmental movement, Patient claims to feel looser post manipulation, Muscle spasm is reducing and Patient is able to do some ADL's with less pain      Procedures:  CMT:  79107 Chiropractic manipulative treatment 1-2 regions performed   Lumbar: Diversified, L1, Prone  Pelvis: Drop Table, Sacrum , Prone    Modalities:  None performed this visit    Therapeutic procedures:  None  Patient was encouraged to contract gluteal musculature when performing extended periods of sitting or standing.  This done to promote proper alignment of lumbopelvic region thus reducing stress placed on affected joints of the lumbar spine    Response to Treatment  Reduction in symptoms as reported by patient    Prognosis: Excellent    Progress towards Goals: Patient is making progress towards the goal.     Recommendations:    Instructions:ice 20 minutes every other hour as needed, heat 15 minutes every other hour as needed and walk 10 minutes    Follow-up:  Return to care in 5 days.

## 2019-07-01 ENCOUNTER — THERAPY VISIT (OUTPATIENT)
Dept: CHIROPRACTIC MEDICINE | Facility: OTHER | Age: 34
End: 2019-07-01
Attending: CHIROPRACTOR
Payer: COMMERCIAL

## 2019-07-01 DIAGNOSIS — M54.50 ACUTE MIDLINE LOW BACK PAIN WITHOUT SCIATICA: ICD-10-CM

## 2019-07-01 DIAGNOSIS — E66.01 CLASS 3 SEVERE OBESITY DUE TO EXCESS CALORIES WITH SERIOUS COMORBIDITY AND BODY MASS INDEX (BMI) OF 40.0 TO 44.9 IN ADULT (H): ICD-10-CM

## 2019-07-01 DIAGNOSIS — M99.03 SEGMENTAL AND SOMATIC DYSFUNCTION OF LUMBAR REGION: Primary | ICD-10-CM

## 2019-07-01 DIAGNOSIS — E66.813 CLASS 3 SEVERE OBESITY DUE TO EXCESS CALORIES WITH SERIOUS COMORBIDITY AND BODY MASS INDEX (BMI) OF 40.0 TO 44.9 IN ADULT (H): ICD-10-CM

## 2019-07-01 PROCEDURE — 98940 CHIROPRACT MANJ 1-2 REGIONS: CPT | Mod: AT | Performed by: CHIROPRACTOR

## 2019-07-01 NOTE — PATIENT INSTRUCTIONS
ice 20 minutes every other hour as needed, heat 15 minutes every other hour as needed and utilize tennis ball for massage when needed

## 2019-07-01 NOTE — PROGRESS NOTES
Visit #:  3    Subjective:  Hoda Abrams is a 34 year old female who is seen in f/u up for:        Segmental and somatic dysfunction of lumbar region  Acute midline low back pain without sciatica  Class 3 severe obesity due to excess calories with serious comorbidity and body mass index (BMI) of 40.0 to 44.9 in adult (H).     Since last visit on 6/26/2019,  Hoda Abrams reports:    Area of chief complaint:  Lumbar :  Symptoms are graded at 0-2/10. The quality is described as stiff, sore.  Patient reports being able to perform most of her ADL's without painful limits. Patient did take an extended car trip this weekend and noted some discomfort with extended sitting. Patient did have some disrupted sleep due to back pain. However these have both been isolated incidents  Patient feels much improvement following last treatment.     Objective:  The following was observed:    P: palpatory tenderness minimal over the TL junction midline:    A: static palpation demonstrates intersegmental asymmetry , lumbar  R: motion palpation notes restricted motion lumbar  T: hypertonicity at: Paraspinal musculature of the TL junction bilaterally:     Segmental spinal dysfunction/restrictions found at:  :  L1 Extension restriction.      Assessment: Patient has shown much improvement since initially beginning care.  Patient may require 1-2 additional visits however at this time no additional visits are being recommended.  This is of course barring acute exacerbation of symptoms at this time.    Diagnoses:      1. Segmental and somatic dysfunction of lumbar region    2. Acute midline low back pain without sciatica    3. Class 3 severe obesity due to excess calories with serious comorbidity and body mass index (BMI) of 40.0 to 44.9 in adult (H)        Patient's condition:  Patient had restrictions pre-manipulation and Patient is noticing a decrease in their symptoms    Treatment effectiveness:  Post manipulation there is better  intersegmental movement, Patient claims to feel looser post manipulation, Tenderness is decreasing, Muscle spasm is reducing and ADL are improving      Procedures:  CMT:  59025 Chiropractic manipulative treatment 1-2 regions performed   Lumbar: Diversified, L1, Prone    Modalities:  None performed this visit    Therapeutic procedures:  Total time: 2 minutes  Patient was encouraged to use a tennis ball for self massage of affected musculature.     Response to Treatment  Reduction in symptoms as reported by patient    Prognosis: Excellent    Progress towards Goals: Patient is making progress towards the goal.     Recommendations:    Instructions:ice 20 minutes every other hour as needed, heat 15 minutes every other hour as needed and utilize tennis ball for massage when needed    Follow-up:  Return to care if symptoms persist.

## 2019-07-02 ENCOUNTER — ALLIED HEALTH/NURSE VISIT (OUTPATIENT)
Dept: FAMILY MEDICINE | Facility: OTHER | Age: 34
End: 2019-07-02
Attending: FAMILY MEDICINE
Payer: COMMERCIAL

## 2019-07-02 VITALS — SYSTOLIC BLOOD PRESSURE: 132 MMHG | DIASTOLIC BLOOD PRESSURE: 80 MMHG

## 2019-07-02 DIAGNOSIS — I10 ESSENTIAL HYPERTENSION: Primary | ICD-10-CM

## 2019-07-02 NOTE — NURSING NOTE
Patient presents to clinic today for a blood pressure check.     Gerri Abrams LPN...................7/2/2019  2:49 PM

## 2020-07-16 DIAGNOSIS — I10 ESSENTIAL HYPERTENSION: ICD-10-CM

## 2020-07-17 RX ORDER — LISINOPRIL 20 MG/1
TABLET ORAL
Qty: 90 TABLET | Refills: 4 | Status: SHIPPED | OUTPATIENT
Start: 2020-07-17 | End: 2021-08-24

## 2021-08-24 DIAGNOSIS — I10 ESSENTIAL HYPERTENSION: ICD-10-CM

## 2021-08-24 RX ORDER — LISINOPRIL 20 MG/1
TABLET ORAL
Qty: 90 TABLET | Refills: 4 | Status: SHIPPED | OUTPATIENT
Start: 2021-08-24 | End: 2022-09-14

## 2021-08-24 NOTE — TELEPHONE ENCOUNTER
"[facility]  sent Rx request for the following:     Requested Prescriptions   Pending Prescriptions Disp Refills     lisinopril (ZESTRIL) 20 MG tablet [Pharmacy Med Name: lisinopril 20 mg tablet] 90 tablet 4     Sig: TAKE 1 TABLET BY MOUTH ONCE DAILY      Last Prescription Date:   2020  Last Fill Qty/Refills:         90, R-4    Last Office Visit:              2019  Future Office visit:           none    Routing refill request to provider for review/approval because:  Labs not current:    Patient needs to be seen because it has been more than 1 year since last office visit.        ACE Inhibitors (Including Combos) Protocol Failed - 2021  7:47 AM        Failed - Blood pressure under 140/90 in past 12 months     BP Readings from Last 3 Encounters:   19 132/80   19 (!) 136/94   18 128/88                 Failed - Recent (12 mo) or future (30 days) visit within the authorizing provider's specialty     Patient has had an office visit with the authorizing provider or a provider within the authorizing providers department within the previous 12 mos or has a future within next 30 days. See \"Patient Info\" tab in inbasket, or \"Choose Columns\" in Meds & Orders section of the refill encounter.              Failed - Normal serum creatinine on file in past 12 months     Recent Labs   Lab Test 05/10/19  0754   CR 0.92       Ok to refill medication if creatinine is low          Failed - Normal serum potassium on file in past 12 months     Recent Labs   Lab Test 05/10/19  0754   POTASSIUM 4.1          Spoke with patient, verified name/. Agrees to schedule ov. Transferred to scheduling.  Unable to complete prescription refill per RN Medication Refill Policy.................... Madelin Castillo RN ....................  2021   3:36 PM          "

## 2021-10-07 ENCOUNTER — OFFICE VISIT (OUTPATIENT)
Dept: FAMILY MEDICINE | Facility: OTHER | Age: 36
End: 2021-10-07
Attending: FAMILY MEDICINE
Payer: COMMERCIAL

## 2021-10-07 VITALS
HEART RATE: 75 BPM | BODY MASS INDEX: 41.95 KG/M2 | WEIGHT: 261 LBS | OXYGEN SATURATION: 98 % | SYSTOLIC BLOOD PRESSURE: 126 MMHG | HEIGHT: 66 IN | TEMPERATURE: 97.1 F | RESPIRATION RATE: 16 BRPM | DIASTOLIC BLOOD PRESSURE: 80 MMHG

## 2021-10-07 DIAGNOSIS — I10 ESSENTIAL HYPERTENSION: ICD-10-CM

## 2021-10-07 DIAGNOSIS — E66.01 CLASS 3 SEVERE OBESITY DUE TO EXCESS CALORIES WITH SERIOUS COMORBIDITY AND BODY MASS INDEX (BMI) OF 40.0 TO 44.9 IN ADULT (H): ICD-10-CM

## 2021-10-07 DIAGNOSIS — Z00.00 ROUTINE HISTORY AND PHYSICAL EXAMINATION OF ADULT: Primary | ICD-10-CM

## 2021-10-07 DIAGNOSIS — E66.813 CLASS 3 SEVERE OBESITY DUE TO EXCESS CALORIES WITH SERIOUS COMORBIDITY AND BODY MASS INDEX (BMI) OF 40.0 TO 44.9 IN ADULT (H): ICD-10-CM

## 2021-10-07 PROCEDURE — 99395 PREV VISIT EST AGE 18-39: CPT | Performed by: FAMILY MEDICINE

## 2021-10-07 ASSESSMENT — PAIN SCALES - GENERAL: PAINLEVEL: NO PAIN (0)

## 2021-10-07 ASSESSMENT — MIFFLIN-ST. JEOR: SCORE: 1887.7

## 2021-10-07 NOTE — PROGRESS NOTES
ANNUAL PHYSICAL - FEMALE  Family Practice    HPI: Hoda presents for a yearly exam.  Concerns include:  1. BP.  On Lisinopril.  Doing well overall.  No SE of medication.  2. Work/pandemic stress.  Managing okay.    No LMP recorded.   Contraception: NA  Risk for STI?: No  Last pap: 5/16/2019, neg co-testing.  5-yr risk of CIN3 is 0.14% per ASCCP calculator.  OK for 5 year follow up.  Any hx of abnormal paps:  8/18/2015; ASCUS with neg HPV.  FH ofearly CA?: No  Cholesterol/DM concerns/screening: last done in 2019; good at that time.  Tobacco?: No  Calcium intake: No  DEXA: NA  Last mammo: @ 41  Colonoscopy: @ 46  Immunizations: Tdap 5/16/2019; flu vaccines yearly with work; Shingrix @ 51; PNA @ 65/67.  Covid x2 complete.  Hep b series complete.    Patient Active Problem List   Diagnosis     Essential hypertension     Obesity       No past medical history on file.    Past Surgical History:   Procedure Laterality Date     EXTRACTION(S) DENTAL      2004       Social History     Socioeconomic History     Marital status: Single     Spouse name: Not on file     Number of children: Not on file     Years of education: Not on file     Highest education level: Not on file   Occupational History     Not on file   Tobacco Use     Smoking status: Never Smoker     Smokeless tobacco: Never Used   Substance and Sexual Activity     Alcohol use: Yes     Comment: Beer a day     Drug use: No     Comment: Drug use: No     Sexual activity: Not Currently   Other Topics Concern     Parent/sibling w/ CABG, MI or angioplasty before 65F 55M? Not Asked   Social History Narrative    Grew up in . 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 Single.     Social Determinants of Health     Financial Resource Strain:      Difficulty of Paying Living Expenses:    Food Insecurity:      Worried About Running Out of Food in the Last Year:      Ran Out of Food in the Last Year:    Transportation Needs:      Lack of Transportation  (Medical):      Lack of Transportation (Non-Medical):    Physical Activity:      Days of Exercise per Week:      Minutes of Exercise per Session:    Stress:      Feeling of Stress :    Social Connections:      Frequency of Communication with Friends and Family:      Frequency of Social Gatherings with Friends and Family:      Attends [employer] Services:      Active Member of Clubs or Organizations:      Attends Club or Organization Meetings:      Marital Status:    Intimate Partner Violence:      Fear of Current or Ex-Partner:      Emotionally Abused:      Physically Abused:      Sexually Abused:        Family History   Problem Relation Age of Onset     Colitis Mother      Obesity Mother      No Known Problems Father      Anxiety Disorder Sister      Obesity Sister      Diabetes Maternal Grandfather      Diabetes Paternal Grandfather      Hypertension Paternal Uncle      Pancreatic Cancer Maternal Aunt        Current Outpatient Medications   Medication Sig Dispense Refill     lisinopril (ZESTRIL) 20 MG tablet TAKE 1 TABLET BY MOUTH ONCE DAILY 90 tablet 4        REVIEW OF SYSTEMS:  Refer to HPI; all other systems reviewed and negative.    PHYSICAL EXAM:  There were no vitals taken for this visit.  CONSTITUTIONAL:  Alert, cooperative, NAD.  EYES: No scleral icterus. EOMFI.  Conjunctiva clear.  ENT/MOUTH: External ears and nose normal.   ENDO: No thyromegaly or thyroid nodules.  CARDIOVASCULAR: RRR  RESPIRATORY: CTAB; normal effort  GI: Bowel sounds wnl.  Soft, nontender, non-distended.    Pap smear obtained: No  MSKEL: Grossly normal ROM.  No clubbing.  INTEGUMENTARY:Warm, dry.  No rash noted on exposed skin.  NEUROLOGIC: Facies symmetric.  Grossly normal movement and tone.  No tremor.  PSYCHIATRIC: Affect normal.  Speech fluent.      PHQ 8/18/2015 5/19/2016   PHQ-9 Total Score 1 1   Q9: Thoughts of better off dead/self-harm past 2 weeks Not at all Not at all     No flowsheet data found.    No results found for any  visits on 10/07/21.    ASSESSMENT/PLAN:  1. Routine history and physical examination of adult    2. Essential hypertension  - Basic Metabolic Panel; Future  - Lipid Panel; Future  - Hemoglobin A1c; Future    3. Obesity, BMI 42.77  - Basic Metabolic Panel; Future  - Lipid Panel; Future  - Hemoglobin A1c; Future    Relevant cancer screening discussed.    Counseled on healthy diet, Calcium and vitamin D intake, and exercise.    Tisha Pham, DO  Family Practice

## 2021-10-07 NOTE — LETTER
October 12, 2021      Hoda Abrams  [address] [place] [address]        Dear ,    Your labs are pretty darn good!  Your blood sugar was just slightly above goal as a fasting level, so we will continue to watch this every 1-2 years.  But overall your hemoglobin A1c level is good (also a diabetes screening test).      Resulted Orders   Hemoglobin A1c   Result Value Ref Range    Hemoglobin A1C 5.6 4.0 - 6.2 %   Lipid Panel   Result Value Ref Range    Cholesterol 148 <200 mg/dL    Triglycerides 63 <150 mg/dL    Direct Measure HDL 43 23 - 92 mg/dL    LDL Cholesterol Calculated 92 <=100 mg/dL    Non HDL Cholesterol 105 <130 mg/dL    Patient Fasting > 8hrs? Yes     Narrative    Cholesterol  Desirable:  <200 mg/dL    Triglycerides  Normal:  Less than 150 mg/dL  Borderline High:  150-199 mg/dL  High:  200-499 mg/dL  Very High:  Greater than or equal to 500 mg/dL    Direct Measure HDL  Female:  Greater than or equal to 50 mg/dL   Male:  Greater than or equal to 40 mg/dL    LDL Cholesterol  Desirable:  <100mg/dL  Above Desirable:  100-129 mg/dL   Borderline High:  130-159 mg/dL   High:  160-189 mg/dL   Very High:  >= 190 mg/dL    Non HDL Cholesterol  Desirable:  130 mg/dL  Above Desirable:  130-159 mg/dL  Borderline High:  160-189 mg/dL  High:  190-219 mg/dL  Very High:  Greater than or equal to 220 mg/dL   Basic Metabolic Panel   Result Value Ref Range    Sodium 136 134 - 144 mmol/L    Potassium 4.4 3.5 - 5.1 mmol/L    Chloride 103 98 - 107 mmol/L    Carbon Dioxide (CO2) 25 21 - 31 mmol/L    Anion Gap 8 3 - 14 mmol/L    Urea Nitrogen 13 7 - 25 mg/dL    Creatinine 0.97 0.60 - 1.20 mg/dL    Calcium 9.4 8.6 - 10.3 mg/dL    Glucose 109 (H) 70 - 105 mg/dL    GFR Estimate 76 >60 mL/min/1.73m2      Comment:      As of July 11, 2021, eGFR is calculated by the CKD-EPI creatinine equation, without race adjustment. eGFR can be influenced by muscle mass, exercise, and diet. The reported eGFR is an estimation only  and is only applicable if the renal function is stable.       If you have any questions or concerns, please call the clinic at the number listed above.       Sincerely,      Tisha Pham, DO  Family Practice

## 2021-10-07 NOTE — NURSING NOTE
"Chief Complaint   Patient presents with     Physical       Initial /80   Pulse 75   Temp 97.1  F (36.2  C) (Tympanic)   Resp 16   Ht 1.664 m (5' 5.5\")   Wt 118.4 kg (261 lb)   LMP 09/20/2021 (Approximate)   SpO2 98%   BMI 42.77 kg/m   Estimated body mass index is 42.77 kg/m  as calculated from the following:    Height as of this encounter: 1.664 m (5' 5.5\").    Weight as of this encounter: 118.4 kg (261 lb).  Medication Reconciliation: complete    Gaby Kelley LPN     FOOD SECURITY SCREENING QUESTIONS  Hunger Vital Signs:  Within the past 12 months we worried whether our food would run out before we got money to buy more. Never  Within the past 12 months the food we bought just didn't last and we didn't have money to get more. Never  Gaby Kelley LPN 10/7/2021 7:48 AM    "

## 2021-10-08 LAB
ANION GAP SERPL CALCULATED.3IONS-SCNC: 8 MMOL/L (ref 3–14)
BUN SERPL-MCNC: 13 MG/DL (ref 7–25)
CALCIUM SERPL-MCNC: 9.4 MG/DL (ref 8.6–10.3)
CHLORIDE BLD-SCNC: 103 MMOL/L (ref 98–107)
CHOLEST SERPL-MCNC: 148 MG/DL
CO2 SERPL-SCNC: 25 MMOL/L (ref 21–31)
CREAT SERPL-MCNC: 0.97 MG/DL (ref 0.6–1.2)
FASTING STATUS PATIENT QL REPORTED: YES
GFR SERPL CREATININE-BSD FRML MDRD: 76 ML/MIN/1.73M2
GLUCOSE BLD-MCNC: 109 MG/DL (ref 70–105)
HBA1C MFR BLD: 5.6 % (ref 4–6.2)
HDLC SERPL-MCNC: 43 MG/DL (ref 23–92)
LDLC SERPL CALC-MCNC: 92 MG/DL
NONHDLC SERPL-MCNC: 105 MG/DL
POTASSIUM BLD-SCNC: 4.4 MMOL/L (ref 3.5–5.1)
SODIUM SERPL-SCNC: 136 MMOL/L (ref 134–144)
TRIGL SERPL-MCNC: 63 MG/DL

## 2021-10-08 PROCEDURE — 36415 COLL VENOUS BLD VENIPUNCTURE: CPT | Mod: ZL | Performed by: FAMILY MEDICINE

## 2021-10-08 PROCEDURE — 80061 LIPID PANEL: CPT | Mod: ZL | Performed by: FAMILY MEDICINE

## 2021-10-08 PROCEDURE — 83036 HEMOGLOBIN GLYCOSYLATED A1C: CPT | Mod: ZL | Performed by: FAMILY MEDICINE

## 2021-10-08 PROCEDURE — 80048 BASIC METABOLIC PNL TOTAL CA: CPT | Mod: ZL | Performed by: FAMILY MEDICINE

## 2021-11-24 ENCOUNTER — LAB REQUISITION (OUTPATIENT)
Dept: LAB | Facility: OTHER | Age: 36
End: 2021-11-24

## 2021-11-24 DIAGNOSIS — Z11.52 ENCOUNTER FOR SCREENING FOR COVID-19: ICD-10-CM

## 2021-11-24 LAB — SARS-COV-2 RNA RESP QL NAA+PROBE: NEGATIVE

## 2021-11-24 PROCEDURE — U0003 INFECTIOUS AGENT DETECTION BY NUCLEIC ACID (DNA OR RNA); SEVERE ACUTE RESPIRATORY SYNDROME CORONAVIRUS 2 (SARS-COV-2) (CORONAVIRUS DISEASE [COVID-19]), AMPLIFIED PROBE TECHNIQUE, MAKING USE OF HIGH THROUGHPUT TECHNOLOGIES AS DESCRIBED BY CMS-2020-01-R: HCPCS | Performed by: FAMILY MEDICINE

## 2021-12-09 ENCOUNTER — IMMUNIZATION (OUTPATIENT)
Dept: FAMILY MEDICINE | Facility: OTHER | Age: 36
End: 2021-12-09
Attending: FAMILY MEDICINE

## 2021-12-09 PROCEDURE — 0004A PR COVID VAC PFIZER DIL RECON 30 MCG/0.3 ML IM: CPT

## 2021-12-09 PROCEDURE — 91300 PR COVID VAC PFIZER DIL RECON 30 MCG/0.3 ML IM: CPT

## 2022-09-14 DIAGNOSIS — I10 ESSENTIAL HYPERTENSION: ICD-10-CM

## 2022-09-14 RX ORDER — LISINOPRIL 20 MG/1
20 TABLET ORAL DAILY
Qty: 90 TABLET | Refills: 1 | Status: SHIPPED | OUTPATIENT
Start: 2022-09-14 | End: 2022-11-30

## 2022-09-14 NOTE — TELEPHONE ENCOUNTER
Reason for call: Medication or medication refill    Name of medication requested: lisinopril     Are you out of the medication? Firday she will be out    What pharmacy do you use? [facility]    Preferred method for responding to this message: Telephone Call    Phone number patient can be reached at: Cell number on file:    Telephone Information:   Mobile [phone]       If we cannot reach you directly, may we leave a detailed response at the number you provided? Yes     Briannarickie Rebollar on 9/14/2022 at 2:51 PM    She has a PX scheduled for 11/30

## 2022-09-17 ENCOUNTER — HEALTH MAINTENANCE LETTER (OUTPATIENT)
Age: 37
End: 2022-09-17

## 2022-09-22 ENCOUNTER — LAB REQUISITION (OUTPATIENT)
Dept: LAB | Facility: OTHER | Age: 37
End: 2022-09-22

## 2022-09-22 LAB
FLUAV RNA SPEC QL NAA+PROBE: NEGATIVE
FLUBV RNA RESP QL NAA+PROBE: NEGATIVE
RSV RNA SPEC NAA+PROBE: NEGATIVE
SARS-COV-2 RNA RESP QL NAA+PROBE: NEGATIVE

## 2022-09-22 PROCEDURE — 87637 SARSCOV2&INF A&B&RSV AMP PRB: CPT | Performed by: FAMILY MEDICINE

## 2022-10-05 ENCOUNTER — THERAPY VISIT (OUTPATIENT)
Dept: CHIROPRACTIC MEDICINE | Facility: OTHER | Age: 37
End: 2022-10-05
Attending: CHIROPRACTOR
Payer: COMMERCIAL

## 2022-10-05 VITALS
SYSTOLIC BLOOD PRESSURE: 128 MMHG | TEMPERATURE: 97.8 F | HEART RATE: 76 BPM | DIASTOLIC BLOOD PRESSURE: 78 MMHG | OXYGEN SATURATION: 99 % | RESPIRATION RATE: 16 BRPM

## 2022-10-05 DIAGNOSIS — M54.50 ACUTE BILATERAL LOW BACK PAIN WITHOUT SCIATICA: Primary | ICD-10-CM

## 2022-10-05 DIAGNOSIS — M99.04 SEGMENTAL AND SOMATIC DYSFUNCTION OF SACRAL REGION: ICD-10-CM

## 2022-10-05 DIAGNOSIS — M99.02 SEGMENTAL AND SOMATIC DYSFUNCTION OF THORACIC REGION: ICD-10-CM

## 2022-10-05 PROCEDURE — 98940 CHIROPRACT MANJ 1-2 REGIONS: CPT | Mod: AT | Performed by: CHIROPRACTOR

## 2022-10-05 PROCEDURE — 99212 OFFICE O/P EST SF 10 MIN: CPT | Mod: 25 | Performed by: CHIROPRACTOR

## 2022-10-05 NOTE — PROGRESS NOTES
Flared on Monday unknown cause. Bilateral lower back is frequently tender and pulsing. 5/10 W24 5/10. Using medications which provide a decrease in pain for a short time.  Iliana Richardson on 10/5/2022 at 7:34 AM    Reviewed by EW    Visit #:  1/3-5  New Episode 10/5/2022  Subjective:  Hoda Abrams is a 37 year old female who is seen in f/u up for:        Acute bilateral low back pain without sciatica  Segmental and somatic dysfunction of thoracic region  Segmental and somatic dysfunction of sacral region.     Since last visit on Visit date not found,  Hoda Abrams reports: Last week patient was up in [facility].  Notes that she was in a forward bent position for extended periods of time taking numerous pictures.  Back pain began shortly following this.  Denies any traumatic events associated with symptoms.  No radicular complaints of the lower extremities, loss of bowel or bladder, saddle paresthesia, weakness of the lower extremities.  No other associated symptoms such as nausea or vomiting.    Patient notes that she is involved in a wedding this weekend and is concerned that back pain will affect ability to participate.      (DVPRS) Pain Rating Score : Interrupts some activities (W24 5/10) (10/05/22 0795)     Objective:  The following was observed:  /78 (BP Location: Right arm, Patient Position: Sitting, Cuff Size: Adult Regular)   Pulse 76   Temp 97.8  F (36.6  C) (Tympanic)   Resp 16   SpO2 99%    Oswestry (NIKKO) Questionnaire    OSWESTRY DISABILITY INDEX 10/5/2022   Count 9   Sum 10   Oswestry Score (%) 22.22   Some recent data might be hidden      Thoracic/lumbar AROM: Rotation WNL, right lateral flexion restriction mild, left lateral flexion WNL, extension restriction moderate with pain    SLR: negative  Ely's: -right, -left    P: palpatory tenderness right PSIS:    A: static palpation demonstrates intersegmental asymmetry , thoracic, pelvis  R: motion palpation notes restricted motion, T12  and  Sacrum   T: muscle spasm at level(s): Quadratus lumborum bilaterally, lumbar paraspinals into lower thoracic paraspinals around TL junction bilaterally:      Segmental spinal dysfunction/restrictions found at:  :  T12 Extension restriction  Sacrum Right lateral flexion restricted and Extension restriction.      Assessment: Segmental/somatic dysfunction present of the right SI joint as well as at the TL junction.  Both which are consistent with patient's subjective reports.  Patient has been under our care in the past with beneficial results and anticipate that to be the case at this time.  No contraindications precluding patient from receiving care today.  Due to upcoming wedding patient was instructed to contact our office Friday morning if symptoms continue to be aggravated.  I will not be in the office at that time however if patient does call and leave a message for chiropractic aide appointment will be set up at that time.  Current plan of care to include 3-5 visits in the next 4 weeks barring acute exacerbation of symptoms.    Diagnoses:      1. Acute bilateral low back pain without sciatica    2. Segmental and somatic dysfunction of thoracic region    3. Segmental and somatic dysfunction of sacral region            Procedures:  E/M [id number]    CMT:  [id number] Chiropractic manipulative treatment 1-2 regions performed   Thoracic: Diversified, T12, Prone  Pelvis: Diversified, Sacrum , Side posture    Modalities:  02321: MSTM:  To Quad lumb  for 3 min    Therapeutic procedures:  Catbacks/pelvic tilts    Response to Treatment  Reduction in symptoms as reported by patient    Prognosis: Good    Goals: Reduce back pain 75%  Improve thoracolumbar AROM  Patient be able to participate in upcoming wedding without painful limitations  Reduce Oswestry score 20-30%     Recommendations:    Instructions:ice 20 minutes every other hour as needed, heat 15 minutes every other hour as needed and stretch as instructed at  visit    Follow-up:  Continue treatment PRN.

## 2022-11-26 NOTE — PROGRESS NOTES
ANNUAL PHYSICAL - FEMALE  Family Practice    HPI: Hoda presents for a yearly exam.  Concerns include:    Answers for HPI/ROS submitted by the patient on 11/29/2022  Frequency of exercise:: None  Getting at least 3 servings of Calcium per day:: Yes  Diet:: Low salt  Taking medications regularly:: Yes  Medication side effects:: None  Bi-annual eye exam:: NO  Dental care twice a year:: NO  Sleep apnea or symptoms of sleep apnea:: None  abdominal pain: No  Blood in stool: No  Blood in urine: No  chest pain: No  chills: No  congestion: No  constipation: No  cough: No  diarrhea: No  dizziness: No  ear pain: No  eye pain: No  nervous/anxious: No  fever: No  frequency: No  genital sores: No  headaches: No  hearing loss: No  heartburn: No  arthralgias: No  joint swelling: No  peripheral edema: No  mood changes: No  myalgias: No  nausea: No  dysuria: No  palpitations: No  Skin sensation changes: No  sore throat: No  urgency: No  rash: No  shortness of breath: No  visual disturbance: No  weakness: No  pelvic pain: No  vaginal bleeding: No  vaginal discharge: No  tenderness: No  breast mass: No  breast discharge: No  Additional concerns today:: No    Hypertension Follow-up    Do you check your blood pressure regularly outside of the clinic? No     Are you following a low salt diet? Yes    Are your blood pressures ever more than 140 on the top number (systolic) OR more   than 90 on the bottom number (diastolic), for example 140/90? No    No LMP recorded.   Contraception: not sexually active currently  Risk for STI?: NA  Last pap: 5/16/2019, negative co-testing. Due 5/16/2024.  Any hx of abnormal paps:  8/18/2015; ASCUS with neg HPV.  FH ofearly CA?: No  Cholesterol/DM concerns/screening: last done in 2021; good at that time.  Tobacco?: No  Calcium intake: No  DEXA: NA  Last mammo: @ 41  Colonoscopy: @ 46  Immunizations: Tdap 5/16/2019; flu vaccines yearly with work; Shingrix @ 51; PNA @ 65/67.  Covid x4 complete.  Hep b  series complete.    Patient Active Problem List   Diagnosis     Essential hypertension     Class 3 severe obesity due to excess calories with serious comorbidity and body mass index (BMI) of 40.0 to 44.9 in adult (H)     No past medical history on file.    Past Surgical History:   Procedure Laterality Date     EXTRACTION(S) DENTAL      2004     Social History     Socioeconomic History     Marital status: Single   Tobacco Use     Smoking status: Never     Smokeless tobacco: Never   Vaping Use     Vaping Use: Never used   Substance and Sexual Activity     Alcohol use: Yes     Alcohol/week: 7.0 standard drinks     Types: 7 Cans of beer per week     Comment: Beer a day     Drug use: No     Comment: Drug use: No     Sexual activity: Not Currently   Social History Narrative    Grew up in . 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 Single.     Family History   Problem Relation Age of Onset     Colitis Mother      Obesity Mother      No Known Problems Father      Anxiety Disorder Sister      Obesity Sister      Diabetes Maternal Grandfather      Diabetes Paternal Grandfather      Hypertension Paternal Uncle      Pancreatic Cancer Maternal Aunt      Current Outpatient Medications   Medication Sig Dispense Refill     lisinopril (ZESTRIL) 20 MG tablet Take 1 tablet (20 mg) by mouth daily 90 tablet 1      REVIEW OF SYSTEMS:  Refer to HPI; all other systems reviewed and negative.    PHYSICAL EXAM:  There were no vitals taken for this visit.  CONSTITUTIONAL:  Alert, cooperative, NAD.  EYES: No scleral icterus.  PERRLA.  Conjunctiva clear.  ENT/MOUTH: External ears and nose normal.  TMs normal.  Moist mucous membranes. Oropharynx clear.    CARDIOVASCULAR: Regular, S1, S2.  No S3 or S4.  No murmur/gallop/rub.  No peripheral edema.  RESPIRATORY: CTA bilaterally, no wheezes, rhonchi or rales.  GI: Bowel sounds wnl.  Soft, nontender, non-distended.  No masses or HSM.  No rebound or guarding.  MSKEL:  Grossly normal ROM.  No clubbing.  INTEGUMENTARY:Warm, dry.  No rash noted on exposed skin.  NEUROLOGIC: Facies symmetric.  Grossly normal movement and tone.  No tremor.  PSYCHIATRIC: Affect normal.  Speech fluent.      PHQ 8/18/2015 5/19/2016   PHQ-9 Total Score 1 1   Q9: Thoughts of better off dead/self-harm past 2 weeks Not at all Not at all       No results found for any visits on 11/30/22.    ASSESSMENT/PLAN:  1. Routine history and physical examination of adult  Pap due 5/2024.    2. Essential hypertension  Chronic, stable.  Renewed medication at current dosing. Monitoring labs to be ordered/drawn.  - lisinopril (ZESTRIL) 20 MG tablet; Take 1 tablet (20 mg) by mouth daily  Dispense: 90 tablet; Refill: 4  - Basic Metabolic Panel; Future  - Lipid Panel; Future    3. Class 3 severe obesity due to excess calories with serious comorbidity and body mass index (BMI) of 40.0 to 44.9 in adult (H)  Chronic, stable.  Continue to monitor glucose, lipid panel and A1c as there is family history of DM as well.  Briefly reviewed possible oral agents for weight loss.  Consider for future or with any weight increase.  - Lipid Panel; Future  - Hemoglobin A1c; Future  - Hepatic Function Panel; Future    DO Wilberto Rangel [facility]

## 2022-11-29 ASSESSMENT — ENCOUNTER SYMPTOMS
DYSURIA: 0
SORE THROAT: 0
DIZZINESS: 0
WEAKNESS: 0
EYE PAIN: 0
JOINT SWELLING: 0
DIARRHEA: 0
HEMATOCHEZIA: 0
ABDOMINAL PAIN: 0
PARESTHESIAS: 0
ARTHRALGIAS: 0
BREAST MASS: 0
PALPITATIONS: 0
CONSTIPATION: 0
NERVOUS/ANXIOUS: 0
HEMATURIA: 0
CHILLS: 0
NAUSEA: 0
FEVER: 0
HEADACHES: 0
HEARTBURN: 0
FREQUENCY: 0
SHORTNESS OF BREATH: 0
MYALGIAS: 0
COUGH: 0

## 2022-11-30 ENCOUNTER — OFFICE VISIT (OUTPATIENT)
Dept: FAMILY MEDICINE | Facility: OTHER | Age: 37
End: 2022-11-30
Attending: FAMILY MEDICINE
Payer: COMMERCIAL

## 2022-11-30 VITALS
DIASTOLIC BLOOD PRESSURE: 80 MMHG | TEMPERATURE: 99.2 F | BODY MASS INDEX: 42.4 KG/M2 | HEIGHT: 65 IN | RESPIRATION RATE: 16 BRPM | SYSTOLIC BLOOD PRESSURE: 124 MMHG | HEART RATE: 70 BPM | OXYGEN SATURATION: 99 % | WEIGHT: 254.5 LBS

## 2022-11-30 DIAGNOSIS — E66.813 CLASS 3 SEVERE OBESITY DUE TO EXCESS CALORIES WITH SERIOUS COMORBIDITY AND BODY MASS INDEX (BMI) OF 40.0 TO 44.9 IN ADULT (H): ICD-10-CM

## 2022-11-30 DIAGNOSIS — Z00.00 ROUTINE HISTORY AND PHYSICAL EXAMINATION OF ADULT: Primary | ICD-10-CM

## 2022-11-30 DIAGNOSIS — I10 ESSENTIAL HYPERTENSION: ICD-10-CM

## 2022-11-30 DIAGNOSIS — E66.01 CLASS 3 SEVERE OBESITY DUE TO EXCESS CALORIES WITH SERIOUS COMORBIDITY AND BODY MASS INDEX (BMI) OF 40.0 TO 44.9 IN ADULT (H): ICD-10-CM

## 2022-11-30 PROCEDURE — 99395 PREV VISIT EST AGE 18-39: CPT | Performed by: FAMILY MEDICINE

## 2022-11-30 RX ORDER — LISINOPRIL 20 MG/1
20 TABLET ORAL DAILY
Qty: 90 TABLET | Refills: 4 | Status: SHIPPED | OUTPATIENT
Start: 2022-11-30 | End: 2023-12-29

## 2022-11-30 ASSESSMENT — PAIN SCALES - GENERAL: PAINLEVEL: NO PAIN (0)

## 2022-11-30 NOTE — NURSING NOTE
"Chief Complaint   Patient presents with     Physical       Initial /80   Pulse 70   Temp 99.2  F (37.3  C) (Tympanic)   Resp 16   Ht 1.657 m (5' 5.25\")   Wt 115.4 kg (254 lb 8 oz)   LMP 11/14/2022 (Approximate)   SpO2 99%   BMI 42.03 kg/m   Estimated body mass index is 42.03 kg/m  as calculated from the following:    Height as of this encounter: 1.657 m (5' 5.25\").    Weight as of this encounter: 115.4 kg (254 lb 8 oz).  Medication Reconciliation: complete    Gaby Kelley LPN     [facility] Directive reviewed.     "

## 2022-12-06 ENCOUNTER — LAB (OUTPATIENT)
Dept: LAB | Facility: OTHER | Age: 37
End: 2022-12-06
Attending: FAMILY MEDICINE
Payer: COMMERCIAL

## 2022-12-06 DIAGNOSIS — E66.01 CLASS 3 SEVERE OBESITY DUE TO EXCESS CALORIES WITH SERIOUS COMORBIDITY AND BODY MASS INDEX (BMI) OF 40.0 TO 44.9 IN ADULT (H): ICD-10-CM

## 2022-12-06 DIAGNOSIS — I10 ESSENTIAL HYPERTENSION: ICD-10-CM

## 2022-12-06 DIAGNOSIS — E66.813 CLASS 3 SEVERE OBESITY DUE TO EXCESS CALORIES WITH SERIOUS COMORBIDITY AND BODY MASS INDEX (BMI) OF 40.0 TO 44.9 IN ADULT (H): ICD-10-CM

## 2022-12-06 LAB
ALBUMIN SERPL BCG-MCNC: 4.3 G/DL (ref 3.5–5.2)
ALP SERPL-CCNC: 54 U/L (ref 35–104)
ALT SERPL W P-5'-P-CCNC: 15 U/L (ref 10–35)
ANION GAP SERPL CALCULATED.3IONS-SCNC: 10 MMOL/L (ref 7–15)
AST SERPL W P-5'-P-CCNC: 16 U/L (ref 10–35)
BILIRUB DIRECT SERPL-MCNC: <0.2 MG/DL (ref 0–0.3)
BILIRUB SERPL-MCNC: 0.7 MG/DL
BUN SERPL-MCNC: 13.8 MG/DL (ref 6–20)
CALCIUM SERPL-MCNC: 9 MG/DL (ref 8.6–10)
CHLORIDE SERPL-SCNC: 103 MMOL/L (ref 98–107)
CHOLEST SERPL-MCNC: 146 MG/DL
CREAT SERPL-MCNC: 0.86 MG/DL (ref 0.51–0.95)
DEPRECATED HCO3 PLAS-SCNC: 26 MMOL/L (ref 22–29)
GFR SERPL CREATININE-BSD FRML MDRD: 89 ML/MIN/1.73M2
GLUCOSE SERPL-MCNC: 114 MG/DL (ref 70–99)
HBA1C MFR BLD: 5.6 % (ref 4–6.2)
HDLC SERPL-MCNC: 44 MG/DL
HOLD SPECIMEN: NORMAL
HOLD SPECIMEN: NORMAL
LDLC SERPL CALC-MCNC: 85 MG/DL
NONHDLC SERPL-MCNC: 102 MG/DL
POTASSIUM SERPL-SCNC: 4.2 MMOL/L (ref 3.4–5.3)
PROT SERPL-MCNC: 7 G/DL (ref 6.4–8.3)
SODIUM SERPL-SCNC: 139 MMOL/L (ref 136–145)
TRIGL SERPL-MCNC: 83 MG/DL

## 2022-12-06 PROCEDURE — 83036 HEMOGLOBIN GLYCOSYLATED A1C: CPT | Mod: ZL

## 2022-12-06 PROCEDURE — 36415 COLL VENOUS BLD VENIPUNCTURE: CPT | Mod: ZL

## 2022-12-06 PROCEDURE — 80053 COMPREHEN METABOLIC PANEL: CPT | Mod: ZL

## 2022-12-06 PROCEDURE — 82248 BILIRUBIN DIRECT: CPT | Mod: ZL

## 2022-12-06 PROCEDURE — 80061 LIPID PANEL: CPT | Mod: ZL

## 2023-08-11 ENCOUNTER — OFFICE VISIT (OUTPATIENT)
Dept: INTERNAL MEDICINE | Facility: OTHER | Age: 38
End: 2023-08-11
Payer: COMMERCIAL

## 2023-08-11 VITALS
TEMPERATURE: 98.3 F | HEIGHT: 66 IN | BODY MASS INDEX: 41.2 KG/M2 | DIASTOLIC BLOOD PRESSURE: 76 MMHG | SYSTOLIC BLOOD PRESSURE: 128 MMHG | OXYGEN SATURATION: 98 % | HEART RATE: 70 BPM | WEIGHT: 256.38 LBS | RESPIRATION RATE: 20 BRPM

## 2023-08-11 DIAGNOSIS — I10 ESSENTIAL HYPERTENSION: ICD-10-CM

## 2023-08-11 DIAGNOSIS — R73.9 ELEVATED RANDOM BLOOD GLUCOSE LEVEL: Primary | ICD-10-CM

## 2023-08-11 DIAGNOSIS — E66.813 CLASS 3 SEVERE OBESITY DUE TO EXCESS CALORIES WITH SERIOUS COMORBIDITY AND BODY MASS INDEX (BMI) OF 40.0 TO 44.9 IN ADULT (H): ICD-10-CM

## 2023-08-11 DIAGNOSIS — E66.01 CLASS 3 SEVERE OBESITY DUE TO EXCESS CALORIES WITH SERIOUS COMORBIDITY AND BODY MASS INDEX (BMI) OF 40.0 TO 44.9 IN ADULT (H): ICD-10-CM

## 2023-08-11 DIAGNOSIS — R74.8 LOW SERUM HDL: ICD-10-CM

## 2023-08-11 LAB
ALBUMIN SERPL BCG-MCNC: 4.5 G/DL (ref 3.5–5.2)
ALP SERPL-CCNC: 56 U/L (ref 35–104)
ALT SERPL W P-5'-P-CCNC: 36 U/L (ref 0–50)
ANION GAP SERPL CALCULATED.3IONS-SCNC: 10 MMOL/L (ref 7–15)
AST SERPL W P-5'-P-CCNC: 29 U/L (ref 0–45)
BASOPHILS # BLD AUTO: 0.1 10E3/UL (ref 0–0.2)
BASOPHILS NFR BLD AUTO: 1 %
BILIRUB SERPL-MCNC: 0.9 MG/DL
BUN SERPL-MCNC: 10 MG/DL (ref 6–20)
CALCIUM SERPL-MCNC: 9.7 MG/DL (ref 8.6–10)
CHLORIDE SERPL-SCNC: 102 MMOL/L (ref 98–107)
CHOLEST SERPL-MCNC: 164 MG/DL
CREAT SERPL-MCNC: 1.14 MG/DL (ref 0.51–0.95)
DEPRECATED HCO3 PLAS-SCNC: 27 MMOL/L (ref 22–29)
EOSINOPHIL # BLD AUTO: 0.1 10E3/UL (ref 0–0.7)
EOSINOPHIL NFR BLD AUTO: 1 %
ERYTHROCYTE [DISTWIDTH] IN BLOOD BY AUTOMATED COUNT: 13.3 % (ref 10–15)
GFR SERPL CREATININE-BSD FRML MDRD: 63 ML/MIN/1.73M2
GLUCOSE SERPL-MCNC: 109 MG/DL (ref 70–99)
HBA1C MFR BLD: 5.3 % (ref 4–6.2)
HCT VFR BLD AUTO: 39.2 % (ref 35–47)
HDLC SERPL-MCNC: 42 MG/DL
HGB BLD-MCNC: 12.8 G/DL (ref 11.7–15.7)
IMM GRANULOCYTES # BLD: 0 10E3/UL
IMM GRANULOCYTES NFR BLD: 0 %
LDLC SERPL CALC-MCNC: 101 MG/DL
LYMPHOCYTES # BLD AUTO: 2.1 10E3/UL (ref 0.8–5.3)
LYMPHOCYTES NFR BLD AUTO: 34 %
MCH RBC QN AUTO: 29.3 PG (ref 26.5–33)
MCHC RBC AUTO-ENTMCNC: 32.7 G/DL (ref 31.5–36.5)
MCV RBC AUTO: 90 FL (ref 78–100)
MONOCYTES # BLD AUTO: 0.4 10E3/UL (ref 0–1.3)
MONOCYTES NFR BLD AUTO: 7 %
NEUTROPHILS # BLD AUTO: 3.5 10E3/UL (ref 1.6–8.3)
NEUTROPHILS NFR BLD AUTO: 57 %
NONHDLC SERPL-MCNC: 122 MG/DL
NRBC # BLD AUTO: 0 10E3/UL
NRBC BLD AUTO-RTO: 0 /100
PLATELET # BLD AUTO: 287 10E3/UL (ref 150–450)
POTASSIUM SERPL-SCNC: 3.9 MMOL/L (ref 3.4–5.3)
PROT SERPL-MCNC: 7.4 G/DL (ref 6.4–8.3)
RBC # BLD AUTO: 4.37 10E6/UL (ref 3.8–5.2)
SODIUM SERPL-SCNC: 139 MMOL/L (ref 136–145)
TRIGL SERPL-MCNC: 106 MG/DL
WBC # BLD AUTO: 6.1 10E3/UL (ref 4–11)

## 2023-08-11 PROCEDURE — 85025 COMPLETE CBC W/AUTO DIFF WBC: CPT | Mod: ZL

## 2023-08-11 PROCEDURE — 80061 LIPID PANEL: CPT | Mod: ZL

## 2023-08-11 PROCEDURE — 80053 COMPREHEN METABOLIC PANEL: CPT | Mod: ZL

## 2023-08-11 PROCEDURE — 36415 COLL VENOUS BLD VENIPUNCTURE: CPT | Mod: ZL

## 2023-08-11 PROCEDURE — 99214 OFFICE O/P EST MOD 30 MIN: CPT

## 2023-08-11 PROCEDURE — 83036 HEMOGLOBIN GLYCOSYLATED A1C: CPT | Mod: ZL

## 2023-08-11 RX ORDER — METFORMIN HCL 500 MG
500 TABLET, EXTENDED RELEASE 24 HR ORAL
Qty: 90 TABLET | Refills: 4 | Status: SHIPPED | OUTPATIENT
Start: 2023-08-11 | End: 2024-05-09

## 2023-08-11 ASSESSMENT — PAIN SCALES - GENERAL: PAINLEVEL: NO PAIN (0)

## 2023-08-11 NOTE — PROGRESS NOTES
Assessment & Plan   Hoda Abrams is a 38 year old presenting for the following health issues:      ICD-10-CM    1. Elevated random blood glucose level  R73.09 Comprehensive Metabolic Panel     Hemoglobin A1c     Lipid Panel     CBC and Differential     metFORMIN (GLUCOPHAGE XR) 500 MG 24 hr tablet     Lipid Panel     Hemoglobin A1c     Comprehensive Metabolic Panel     CBC and Differential      2. Class 3 severe obesity due to excess calories with serious comorbidity and body mass index (BMI) of 40.0 to 44.9 in adult (H)  E66.01 Comprehensive Metabolic Panel    Z68.41 Hemoglobin A1c     Lipid Panel     CBC and Differential     metFORMIN (GLUCOPHAGE XR) 500 MG 24 hr tablet     Lipid Panel     Hemoglobin A1c     Comprehensive Metabolic Panel     CBC and Differential      3. Essential hypertension  I10         HYPERTENSION - Ongoing. Blood pressure is currently well controlled.  Medication side effects: None. Denies syncope or presyncope.   No changes for now. Continue - Lisinopril 20 mg daily.   Medication list reviewed/updated. Refills completed as needed.      OBESITY - Ongoing. Discussed need for reduced oral caloric intake, weight loss, regular exercise and reduce carbohydrate/sugar intake.  Discussed starting metformin today to assist with weight loss, improve glycemic control.  She will start with 500 mg 1 tablet daily at supper, if she tolerates this well she may increase to 2 times daily.    Low HDL-encouraged exercise to help raise this level, use olive oil, start omega-3 fish oil supplementation.         No follow-ups on file.    APPLE [facility]      Subjective   Hoda is a 38 year old, presenting for the following health issues:    Presents to clinic for lab work, diabetes screening.  She has been using a sample Dexcom 7 and has noted to have elevated blood sugars.  She has not had any blood sugars greater than 200.  She is asymptomatic with this.  Previous  hemoglobin A1c was close to prediabetes at 5.6, she does have a family history of diabetes.  She also has history of class III obesity-placing her at a higher risk for development of prediabetes/diabetes.  She is interested in discussing starting metformin to assist in weight loss, improve glycemic control.          Diabetes (Folllow up Dexcom trial   Leanne LKota Ruby LPN on 8/11/2023 at 1:40 PM//)      History of Present Illness       Diabetes:   She presents for follow up of diabetes.   She is checking home blood glucose with a continuous glucose monitor.   She checks blood glucose before meals.  Blood glucose is never over 200 and never under 70. She is aware of hypoglycemia symptoms including none.   She is concerned about other.    She is not experiencing numbness or burning in feet, excessive thirst, blurry vision, weight changes or redness, sores or blisters on feet.           She eats 2-3 servings of fruits and vegetables daily.She consumes 0 sweetened beverage(s) daily.She exercises with enough effort to increase her heart rate 60 or more minutes per day.  She exercises with enough effort to increase her heart rate 5 days per week.   She is taking medications regularly.       Diabetes Follow-up    How often are you checking your blood sugar? Continuous glucose monitor  What time of day are you checking your blood sugars (select all that apply)?  Not applicable  Have you had any blood sugars above 200?  No  Have you had any blood sugars below 70?  No  What symptoms do you notice when your blood sugar is low?  None and Not applicable  What concerns do you have today about your diabetes? None    Do you have any of these symptoms? (Select all that apply)  No numbness or tingling in feet.  No redness, sores or blisters on feet.  No complaints of excessive thirst.  No reports of blurry vision.  No significant changes to weight.      BP Readings from Last 2 Encounters:   08/11/23 128/76   11/30/22 124/80  "    Hemoglobin A1C (%)   Date Value   08/11/2023 5.3   12/06/2022 5.6   05/10/2019 5.5     LDL Cholesterol Calculated (mg/dL)   Date Value   08/11/2023 101 (H)   12/06/2022 85   05/10/2019 81   06/28/2017 82                 Review of Systems   Constitutional:  Negative for fever.   Respiratory:  Negative for cough and shortness of breath.    Cardiovascular:  Negative for chest pain and palpitations.   Endocrine: Negative for polydipsia, polyphagia and polyuria.   All other systems reviewed and are negative.     Constitutional, HEENT, cardiovascular, pulmonary, gi and gu systems are negative, except as otherwise noted.      Objective    /76   Pulse 70   Temp 98.3  F (36.8  C)   Resp 20   Ht 1.664 m (5' 5.5\")   Wt 116.3 kg (256 lb 6 oz)   LMP 07/26/2023 (Approximate)   SpO2 98%   BMI 42.01 kg/m    Body mass index is 42.01 kg/m .  Physical Exam  Vitals reviewed.   Constitutional:       General: She is not in acute distress.     Appearance: Normal appearance. She is not toxic-appearing.   HENT:      Head: Normocephalic and atraumatic.   Eyes:      General:         Right eye: No discharge.         Left eye: No discharge.      Conjunctiva/sclera: Conjunctivae normal.      Pupils: Pupils are equal, round, and reactive to light.   Musculoskeletal:         General: Normal range of motion.   Skin:     General: Skin is warm and dry.   Neurological:      General: No focal deficit present.      Mental Status: She is alert and oriented to person, place, and time. Mental status is at baseline.      Motor: No weakness.   Psychiatric:         Mood and Affect: Mood normal.         Behavior: Behavior normal.            Results for orders placed or performed in visit on 08/11/23   Lipid Panel     Status: Abnormal   Result Value Ref Range    Cholesterol 164 <200 mg/dL    Triglycerides 106 <150 mg/dL    Direct Measure HDL 42 (L) >=50 mg/dL    LDL Cholesterol Calculated 101 (H) <=100 mg/dL    Non HDL Cholesterol 122 <130 " mg/dL    Narrative    Cholesterol  Desirable:  <200 mg/dL    Triglycerides  Normal:  Less than 150 mg/dL  Borderline High:  150-199 mg/dL  High:  200-499 mg/dL  Very High:  Greater than or equal to 500 mg/dL    Direct Measure HDL  Female:  Greater than or equal to 50 mg/dL   Male:  Greater than or equal to 40 mg/dL    LDL Cholesterol  Desirable:  <100mg/dL  Above Desirable:  100-129 mg/dL   Borderline High:  130-159 mg/dL   High:  160-189 mg/dL   Very High:  >= 190 mg/dL    Non HDL Cholesterol  Desirable:  130 mg/dL  Above Desirable:  130-159 mg/dL  Borderline High:  160-189 mg/dL  High:  190-219 mg/dL  Very High:  Greater than or equal to 220 mg/dL   Hemoglobin A1c     Status: Normal   Result Value Ref Range    Hemoglobin A1C 5.3 4.0 - 6.2 %   Comprehensive Metabolic Panel     Status: Abnormal   Result Value Ref Range    Sodium 139 136 - 145 mmol/L    Potassium 3.9 3.4 - 5.3 mmol/L    Chloride 102 98 - 107 mmol/L    Carbon Dioxide (CO2) 27 22 - 29 mmol/L    Anion Gap 10 7 - 15 mmol/L    Urea Nitrogen 10.0 6.0 - 20.0 mg/dL    Creatinine 1.14 (H) 0.51 - 0.95 mg/dL    Calcium 9.7 8.6 - 10.0 mg/dL    Glucose 109 (H) 70 - 99 mg/dL    Alkaline Phosphatase 56 35 - 104 U/L    AST 29 0 - 45 U/L    ALT 36 0 - 50 U/L    Protein Total 7.4 6.4 - 8.3 g/dL    Albumin 4.5 3.5 - 5.2 g/dL    Bilirubin Total 0.9 <=1.2 mg/dL    GFR Estimate 63 >60 mL/min/1.73m2   CBC with platelets and differential     Status: None   Result Value Ref Range    WBC Count 6.1 4.0 - 11.0 10e3/uL    RBC Count 4.37 3.80 - 5.20 10e6/uL    Hemoglobin 12.8 11.7 - 15.7 g/dL    Hematocrit 39.2 35.0 - 47.0 %    MCV 90 78 - 100 fL    MCH 29.3 26.5 - 33.0 pg    MCHC 32.7 31.5 - 36.5 g/dL    RDW 13.3 10.0 - 15.0 %    Platelet Count 287 150 - 450 10e3/uL    % Neutrophils 57 %    % Lymphocytes 34 %    % Monocytes 7 %    % Eosinophils 1 %    % Basophils 1 %    % Immature Granulocytes 0 %    NRBCs per 100 WBC 0 <1 /100    Absolute Neutrophils 3.5 1.6 - 8.3 10e3/uL     Absolute Lymphocytes 2.1 0.8 - 5.3 10e3/uL    Absolute Monocytes 0.4 0.0 - 1.3 10e3/uL    Absolute Eosinophils 0.1 0.0 - 0.7 10e3/uL    Absolute Basophils 0.1 0.0 - 0.2 10e3/uL    Absolute Immature Granulocytes 0.0 <=0.4 10e3/uL    Absolute NRBCs 0.0 10e3/uL   CBC and Differential     Status: None    Narrative    The following orders were created for panel order CBC and Differential.  Procedure                               Abnormality         Status                     ---------                               -----------         ------                     CBC with platelets and d...[[id number]]                      Final result                 Please view results for these tests on the individual orders.

## 2023-08-13 PROBLEM — R74.8 LOW SERUM HDL: Status: ACTIVE | Noted: 2023-08-13

## 2023-08-13 ASSESSMENT — ENCOUNTER SYMPTOMS
POLYPHAGIA: 0
PALPITATIONS: 0
POLYDIPSIA: 0
COUGH: 0
SHORTNESS OF BREATH: 0
FEVER: 0

## 2023-12-28 DIAGNOSIS — I10 ESSENTIAL HYPERTENSION: ICD-10-CM

## 2023-12-29 RX ORDER — LISINOPRIL 20 MG/1
20 TABLET ORAL DAILY
Qty: 90 TABLET | Refills: 4 | Status: SHIPPED | OUTPATIENT
Start: 2023-12-29 | End: 2024-05-09

## 2023-12-29 NOTE — TELEPHONE ENCOUNTER
[facility] sent Rx request for the following:    URGENT!     Requested Prescriptions   Pending Prescriptions Disp Refills    lisinopril (ZESTRIL) 20 MG tablet [Pharmacy Med Name: lisinopril 20 mg tablet] 90 tablet 4     Sig: Take 1 tablet (20 mg) by mouth daily       ACE Inhibitors (Including Combos) Protocol Failed - 12/29/2023  8:57 AM        Failed - Recent (12 mo) or future (30 days) visit within the authorizing provider's specialty        Failed - Normal serum creatinine on file in past 12 months     Recent Labs   Lab Test 08/11/23  1445   CR 1.14*   Ok to refill medication if creatinine is low     Last Prescription Date:   11/30/22  Last Fill Qty/Refills:         90, R-4    Last Office Visit:              8/11/23   Future Office visit:           None    Unable to complete prescription refill per RN Medication Refill Policy.     Amita Sarmiento RN .............. 12/29/2023  9:00 AM

## 2024-02-25 ENCOUNTER — HEALTH MAINTENANCE LETTER (OUTPATIENT)
Age: 39
End: 2024-02-25

## 2024-05-08 SDOH — HEALTH STABILITY: PHYSICAL HEALTH: ON AVERAGE, HOW MANY MINUTES DO YOU ENGAGE IN EXERCISE AT THIS LEVEL?: 30 MIN

## 2024-05-08 SDOH — HEALTH STABILITY: PHYSICAL HEALTH: ON AVERAGE, HOW MANY DAYS PER WEEK DO YOU ENGAGE IN MODERATE TO STRENUOUS EXERCISE (LIKE A BRISK WALK)?: 2 DAYS

## 2024-05-08 ASSESSMENT — SOCIAL DETERMINANTS OF HEALTH (SDOH): HOW OFTEN DO YOU GET TOGETHER WITH FRIENDS OR RELATIVES?: TWICE A WEEK

## 2024-05-09 ENCOUNTER — OFFICE VISIT (OUTPATIENT)
Dept: FAMILY MEDICINE | Facility: OTHER | Age: 39
End: 2024-05-09
Attending: FAMILY MEDICINE
Payer: COMMERCIAL

## 2024-05-09 VITALS
HEART RATE: 72 BPM | DIASTOLIC BLOOD PRESSURE: 78 MMHG | OXYGEN SATURATION: 99 % | WEIGHT: 254 LBS | RESPIRATION RATE: 16 BRPM | HEIGHT: 65 IN | SYSTOLIC BLOOD PRESSURE: 124 MMHG | TEMPERATURE: 97.9 F | BODY MASS INDEX: 42.32 KG/M2

## 2024-05-09 DIAGNOSIS — E66.813 CLASS 3 SEVERE OBESITY DUE TO EXCESS CALORIES WITH SERIOUS COMORBIDITY AND BODY MASS INDEX (BMI) OF 40.0 TO 44.9 IN ADULT (H): ICD-10-CM

## 2024-05-09 DIAGNOSIS — I10 ESSENTIAL HYPERTENSION: ICD-10-CM

## 2024-05-09 DIAGNOSIS — R73.9 ELEVATED RANDOM BLOOD GLUCOSE LEVEL: ICD-10-CM

## 2024-05-09 DIAGNOSIS — Z00.00 ROUTINE HISTORY AND PHYSICAL EXAMINATION OF ADULT: Primary | ICD-10-CM

## 2024-05-09 DIAGNOSIS — Z12.4 CERVICAL CANCER SCREENING: ICD-10-CM

## 2024-05-09 DIAGNOSIS — E66.01 CLASS 3 SEVERE OBESITY DUE TO EXCESS CALORIES WITH SERIOUS COMORBIDITY AND BODY MASS INDEX (BMI) OF 40.0 TO 44.9 IN ADULT (H): ICD-10-CM

## 2024-05-09 PROCEDURE — 99395 PREV VISIT EST AGE 18-39: CPT | Performed by: FAMILY MEDICINE

## 2024-05-09 PROCEDURE — 87624 HPV HI-RISK TYP POOLED RSLT: CPT | Mod: ZL | Performed by: FAMILY MEDICINE

## 2024-05-09 PROCEDURE — G0123 SCREEN CERV/VAG THIN LAYER: HCPCS | Performed by: FAMILY MEDICINE

## 2024-05-09 RX ORDER — METFORMIN HCL 500 MG
500 TABLET, EXTENDED RELEASE 24 HR ORAL
Qty: 90 TABLET | Refills: 4 | Status: SHIPPED | OUTPATIENT
Start: 2024-05-09

## 2024-05-09 RX ORDER — LISINOPRIL 20 MG/1
20 TABLET ORAL DAILY
Qty: 90 TABLET | Refills: 4 | Status: SHIPPED | OUTPATIENT
Start: 2024-05-09

## 2024-05-09 ASSESSMENT — PAIN SCALES - GENERAL: PAINLEVEL: NO PAIN (0)

## 2024-05-09 NOTE — NURSING NOTE
"Chief Complaint   Patient presents with    Physical       Initial /78   Pulse 72   Temp 97.9  F (36.6  C) (Tympanic)   Resp 16   Ht 1.657 m (5' 5.25\")   Wt 115.2 kg (254 lb)   LMP 05/04/2024 (Exact Date)   SpO2 99%   BMI 41.94 kg/m   Estimated body mass index is 41.94 kg/m  as calculated from the following:    Height as of this encounter: 1.657 m (5' 5.25\").    Weight as of this encounter: 115.2 kg (254 lb).  Medication Review: complete    The next two questions are to help us understand your food security.  If you are feeling you need any assistance in this area, we have resources available to support you today.          5/8/2024   SDOH- Food Insecurity   Within the past 12 months, did you worry that your food would run out before you got money to buy more? N   Within the past 12 months, did the food you bought just not last and you didn t have money to get more? N         Health Care Directive:  Patient does not have a Health Care Directive or Living Will: Discussed advance care planning with patient; however, patient declined at this time.    Gaby Kelley LPN      "

## 2024-05-09 NOTE — PROGRESS NOTES
"Preventive Care Visit  [facility]  Tisha Pham DO, Family Medicine  May 9, 2024      Assessment & Plan     1. Routine history and physical examination of adult    2. Cervical cancer screening  Updated  - Pap Screen with HPV - recommended age 30 - 65 years    3. Class 3 severe obesity due to excess calories with serious comorbidity and body mass index (BMI) of 40.0 to 44.9 in adult (H)  Chronic, on metformin.  Consideration for GLP-1 agonist if availability increases.  Healthy lifestyle.  - metFORMIN (GLUCOPHAGE XR) 500 MG 24 hr tablet; Take 1 tablet (500 mg) by mouth daily (with dinner)  Dispense: 90 tablet; Refill: 4    4. Elevated random blood glucose level  Chronic, now on metformin.  IR contributing.  - metFORMIN (GLUCOPHAGE XR) 500 MG 24 hr tablet; Take 1 tablet (500 mg) by mouth daily (with dinner)  Dispense: 90 tablet; Refill: 4    5. Essential hypertension  Chronic and hereditary, stable today on current lisinopril 20mg.  Renewed current dosing x 1 year.  - lisinopril (ZESTRIL) 20 MG tablet; Take 1 tablet (20 mg) by mouth daily  Dispense: 90 tablet; Refill: 4      Patient has been advised of split billing requirements and indicates understanding: Yes    MDM:  Prescription drug management      BMI  Estimated body mass index is 41.94 kg/m  as calculated from the following:    Height as of this encounter: 1.657 m (5' 5.25\").    Weight as of this encounter: 115.2 kg (254 lb).   Weight management plan: Discussed healthy diet and exercise guidelines    Counseling  Appropriate preventive services were discussed with this patient, including applicable screening as appropriate for fall prevention, nutrition, physical activity, Tobacco-use cessation, weight loss and cognition.  Checklist reviewing preventive services available has been given to the patient.  Reviewed patient's diet, addressing concerns and/or questions.   She is at risk for lack of exercise and has been provided with " information to increase physical activity for the benefit of her well-being.   The patient was instructed to see the dentist every 6 months.     Follow up: at least yearly      Subjective   Hoda is a 38 year old, presenting for the following:  Physical        5/9/2024    10:53 AM   Additional Questions   Roomed by REI Griffin   Accompanied by self        Health Care Directive  Patient does not have a Health Care Directive or Living Will: Discussed advance care planning with patient; however, patient declined at this time.    HPI    Had some labs completed in August 2023 after being told some of her blood sugar levels were high (when trying a CGM with the rep). A1c was only 5.3, and random glucose was only 109.  But did start low dose metformin to help with IR - currently taking 500mg daily and doesn't feel like it's doing anything (no side effects either).  Cholesterol panel at that same time did show LDL of 101 and HDL of 42.           5/8/2024   General Health   How would you rate your overall physical health? Good   Feel stress (tense, anxious, or unable to sleep) To some extent   (!) STRESS CONCERN      5/8/2024   Nutrition   Three or more servings of calcium each day? Yes   Diet: Regular (no restrictions)   How many servings of fruit and vegetables per day? (!) 2-3   How many sweetened beverages each day? 0-1         5/8/2024   Exercise   Days per week of moderate/strenous exercise 2 days   Average minutes spent exercising at this level 30 min   (!) EXERCISE CONCERN      5/8/2024   Social Factors   Frequency of gathering with friends or relatives Twice a week   Worry food won't last until get money to buy more No   Food not last or not have enough money for food? No   Do you have housing?  Yes   Are you worried about losing your housing? No   Lack of transportation? No   Unable to get utilities (heat,electricity)? No         5/8/2024   Dental   Dentist two times every year? (!) NO         5/8/2024   TB  Screening   Were you born outside of the [place]? No         Today's PHQ-2 Score:       5/8/2024     8:01 PM   PHQ-2 ( 1999 [employer])   Q1: Little interest or pleasure in doing things 0   Q2: Feeling down, depressed or hopeless 0   PHQ-2 Score 0   Q1: Little interest or pleasure in doing things Not at all   Q2: Feeling down, depressed or hopeless Not at all   PHQ-2 Score 0           5/8/2024   Substance Use   Alcohol more than 3/day or more than 7/wk No   Do you use any other substances recreationally? No     Social History     Tobacco Use    Smoking status: Never    Smokeless tobacco: Never   Vaping Use    Vaping status: Never Used   Substance Use Topics    Alcohol use: Yes     Alcohol/week: 7.0 standard drinks of alcohol     Types: 7 Cans of beer per week     Comment: Beer a day    Drug use: No     Comment: Drug use: No          Mammogram Screening - Patient under 40 years of age: Routine Mammogram Screening not recommended.         5/8/2024   STI Screening   New sexual partner(s) since last STI/HIV test? No     History of abnormal Pap smear: NO - age 30-65 PAP every 5 years with negative HPV co-testing recommended        Latest Ref Rng & Units 5/16/2019     2:52 PM   PAP / HPV   PAP (Historical)  NIL    HPV 16 DNA NEG^Negative Negative    HPV 18 DNA NEG^Negative Negative    Other HR HPV NEG^Negative Negative            5/8/2024   Contraception/Family Planning   Questions about contraception or family planning No        Reviewed and updated as needed this visit by Provider   Tobacco  Allergies  Meds  Problems  Med Hx  Surg Hx  Fam Hx          No LMP recorded.   Contraception: not sexually active currently  Risk for STI?: NA  Last pap: 5/16/2019, negative co-testing. DUE  Any hx of abnormal paps:  8/18/2015; ASCUS with neg HPV.  FH of early CA?: No  Cholesterol/DM concerns/screening: last done in 2023; good at that time.  Tobacco?: No  Calcium intake: No  DEXA: NA  Last mammo: @ 41  Colonoscopy: @  "46  Immunizations: Tdap 5/16/2019; flu vaccines yearly with work; Shingrix @ 50; RSV @ 60; PNA @ 65.  Candidate for seasonal boosters (Covid).  HepB series complete.    History reviewed. No pertinent past medical history.  Past Surgical History:   Procedure Laterality Date    EXTRACTION(S) DENTAL      2004     OB History   No obstetric history on file.     BP Readings from Last 3 Encounters:   05/09/24 124/78   08/11/23 128/76   11/30/22 124/80    Wt Readings from Last 3 Encounters:   05/09/24 115.2 kg (254 lb)   08/11/23 116.3 kg (256 lb 6 oz)   11/30/22 115.4 kg (254 lb 8 oz)                  Patient Active Problem List   Diagnosis    Essential hypertension    Class 3 severe obesity due to excess calories with serious comorbidity and body mass index (BMI) of 40.0 to 44.9 in adult (H)    Low serum HDL     Past Surgical History:   Procedure Laterality Date    EXTRACTION(S) DENTAL      2004       Social History     Tobacco Use    Smoking status: Never    Smokeless tobacco: Never   Substance Use Topics    Alcohol use: Yes     Alcohol/week: 7.0 standard drinks of alcohol     Types: 7 Cans of beer per week     Comment: Beer a day     Family History   Problem Relation Age of Onset    Colitis Mother     Obesity Mother         on Ozempic    No Known Problems Father     Anxiety Disorder Sister     Obesity Sister         on Ozempic    Obesity Sister     Diabetes Maternal Grandfather     Diabetes Paternal Grandfather     Pancreatic Cancer Maternal Aunt     Hypertension Paternal Uncle          Current Outpatient Medications   Medication Sig Dispense Refill    lisinopril (ZESTRIL) 20 MG tablet Take 1 tablet (20 mg) by mouth daily 90 tablet 4    metFORMIN (GLUCOPHAGE XR) 500 MG 24 hr tablet Take 1 tablet (500 mg) by mouth daily (with dinner) 90 tablet 4     No Known Allergies       Objective    Exam  /78   Pulse 72   Temp 97.9  F (36.6  C) (Tympanic)   Resp 16   Ht 1.657 m (5' 5.25\")   Wt 115.2 kg (254 lb)   LMP " "05/04/2024 (Exact Date)   SpO2 99%   BMI 41.94 kg/m     Estimated body mass index is 41.94 kg/m  as calculated from the following:    Height as of this encounter: 1.657 m (5' 5.25\").    Weight as of this encounter: 115.2 kg (254 lb).    Physical Exam  GENERAL: alert and no distress  EYES: Eyes grossly normal to inspection, PERRL and conjunctivae and sclerae normal  HENT: ear canals and TM's normal, nose and mouth without ulcers or lesions  NECK: no adenopathy, no asymmetry, masses, or scars  RESP: lungs clear to auscultation - no rales, rhonchi or wheezes  BREAST: normal without masses, tenderness or nipple discharge and no palpable axillary masses or adenopathy  CV: regular rate and rhythm, normal S1 S2, no S3 or S4, no murmur, click or rub, no peripheral edema  ABDOMEN: soft, nontender, no hepatosplenomegaly, no masses and bowel sounds normal   (female): normal female external genitalia, normal urethral meatus, normal vaginal mucosa  MS: no gross musculoskeletal defects noted, no edema  SKIN: no suspicious lesions or rashes  NEURO: Normal strength and tone, mentation intact and speech normal  PSYCH: mentation appears normal, affect normal/bright    Signed Electronically by: Tisha Pham,     "

## 2024-05-16 LAB
BKR LAB AP GYN ADEQUACY: NORMAL
BKR LAB AP GYN INTERPRETATION: NORMAL
BKR LAB AP HPV REFLEX: NORMAL
BKR LAB AP PREVIOUS ABNORMAL: NORMAL
PATH REPORT.COMMENTS IMP SPEC: NORMAL
PATH REPORT.COMMENTS IMP SPEC: NORMAL
PATH REPORT.RELEVANT HX SPEC: NORMAL

## 2024-05-22 LAB
HUMAN PAPILLOMA VIRUS 16 DNA: NEGATIVE
HUMAN PAPILLOMA VIRUS 18 DNA: NEGATIVE
HUMAN PAPILLOMA VIRUS FINAL DIAGNOSIS: NORMAL
HUMAN PAPILLOMA VIRUS OTHER HR: NEGATIVE

## 2025-04-09 ENCOUNTER — PATIENT OUTREACH (OUTPATIENT)
Dept: CARE COORDINATION | Facility: CLINIC | Age: 40
End: 2025-04-09
Payer: COMMERCIAL

## 2025-06-28 ENCOUNTER — HEALTH MAINTENANCE LETTER (OUTPATIENT)
Age: 40
End: 2025-06-28

## 2025-07-07 DIAGNOSIS — R73.9 ELEVATED RANDOM BLOOD GLUCOSE LEVEL: ICD-10-CM

## 2025-07-07 DIAGNOSIS — E66.813 CLASS 3 SEVERE OBESITY DUE TO EXCESS CALORIES WITH SERIOUS COMORBIDITY AND BODY MASS INDEX (BMI) OF 40.0 TO 44.9 IN ADULT (H): ICD-10-CM

## 2025-07-07 DIAGNOSIS — I10 ESSENTIAL HYPERTENSION: ICD-10-CM

## 2025-07-08 RX ORDER — METFORMIN HYDROCHLORIDE 500 MG/1
500 TABLET, EXTENDED RELEASE ORAL
Qty: 90 TABLET | Refills: 4 | Status: SHIPPED | OUTPATIENT
Start: 2025-07-08

## 2025-07-08 RX ORDER — LISINOPRIL 20 MG/1
20 TABLET ORAL DAILY
Qty: 90 TABLET | Refills: 4 | Status: SHIPPED | OUTPATIENT
Start: 2025-07-08

## 2025-07-08 NOTE — TELEPHONE ENCOUNTER
[facility] sent Rx request for the following:      Requested Prescriptions   Pending Prescriptions Disp Refills    metFORMIN (GLUCOPHAGE XR) 500 MG 24 hr tablet [Pharmacy Med Name: metformin  mg tablet,extended release 24 hr] 90 tablet 4     Sig: Take 1 tablet (500 mg) by mouth daily (with dinner)   Last Prescription Date:   5/9/24  Last Fill Qty/Refills:         90, R-4      Biguanide Agents Failed - 7/8/2025  9:17 AM        Failed - Has GFR on file in past 12 months and most recent value is normal        Failed - Recent (6 month) or future (90 days) visit with the authorizing provider's specialty (provided they have been seen in the past 9 months)       lisinopril (ZESTRIL) 20 MG tablet [Pharmacy Med Name: lisinopril 20 mg tablet] 90 tablet 4     Sig: Take 1 tablet (20 mg) by mouth daily   Last Prescription Date:   5/9/24  Last Fill Qty/Refills:         90, R-4      ACE Inhibitors (Including Combos) Protocol Failed - 7/8/2025  9:17 AM        Failed - Most recent blood pressure under 140/90 in past 12 months- Clinicial or Patient Reported     BP Readings from Last 3 Encounters:   05/09/24 124/78   08/11/23 128/76   11/30/22 124/80   No data recorded        Failed - Recent (12 month) or future (90 days) visit with authorizing provider's specialty (provided they have been seen in the past 15 months)        Failed - Most recent GFR on file in the past 12 months >30        Failed - Normal serum potassium on file in past 12 months     Recent Labs   Lab Test 08/11/23  1445   POTASSIUM 3.9        Last Office Visit:              5/9/24 (Physical)   Future Office visit:           None    Overdue for annual exam. Pt aware.     Unable to complete prescription refill per RN Medication Refill Policy. Amita Sarmiento, Refill RN .............. 7/8/2025  9:18 AM